# Patient Record
Sex: FEMALE | Race: WHITE | NOT HISPANIC OR LATINO | Employment: OTHER | URBAN - METROPOLITAN AREA
[De-identification: names, ages, dates, MRNs, and addresses within clinical notes are randomized per-mention and may not be internally consistent; named-entity substitution may affect disease eponyms.]

---

## 2017-10-02 ENCOUNTER — TELEPHONE (OUTPATIENT)
Dept: BARIATRICS | Facility: CLINIC | Age: 57
End: 2017-10-02

## 2017-10-02 NOTE — LETTER
[facility] - Bariatric Surgery  [address]  [facility] [address]  Phone: [phone]  Fax: [phone]     2017         Angela Avalos  [address]  [place] [address]    Patient: Angela Avaols  MRN: [id number]  : 1960    Dear Angela Avalos:    Thank you for choosing [facility]. It is time to come in for a follow-up office visit. As part of our ongoing quest to help patients lose weight and more importantly maintain that weight loss for years to come, regular follow up appointments are essential.    The [facility] is accredited by the Metabolic and Bariatric Surgery Accreditation and Quality Improvement Program. We are interested in your progress and how you have been feeling! As you know, completing regular follow-up office visits with your bariatric provider will help to maintain your successful weight loss. Additionally, being able to collect information on the positive outcomes of surgery will help us to prove the positive benefits of weight loss surgery for all our patients.     Please do not hesitate to call if you have any questions. Please call the office at [phone] to schedule your follow-up visit.    Sincerely,    MD Shahab Delvalle MD Amber Weydert, PA-C Mellanie Merrit, PA-C  [facility] - [place], [place]

## 2018-03-13 PROBLEM — D68.59 THROMBOPHILIA: Status: ACTIVE | Noted: 2018-03-13

## 2018-03-13 PROBLEM — B00.9 HERPES INFECTION: Status: ACTIVE | Noted: 2018-03-13

## 2018-03-13 PROBLEM — M15.0 PRIMARY OSTEOARTHRITIS INVOLVING MULTIPLE JOINTS: Status: ACTIVE | Noted: 2018-03-13

## 2018-03-13 PROBLEM — M15.9 PRIMARY OSTEOARTHRITIS INVOLVING MULTIPLE JOINTS: Status: ACTIVE | Noted: 2018-03-13

## 2018-06-21 PROBLEM — E55.9 VITAMIN D DEFICIENCY: Status: ACTIVE | Noted: 2018-06-21

## 2018-06-21 PROBLEM — D69.6 THROMBOCYTOPENIA: Status: ACTIVE | Noted: 2018-06-21

## 2018-06-21 PROBLEM — K76.0 FATTY LIVER DISEASE, NONALCOHOLIC: Status: ACTIVE | Noted: 2018-06-21

## 2018-06-21 PROBLEM — E11.9 TYPE 2 DIABETES MELLITUS WITHOUT COMPLICATION, WITHOUT LONG-TERM CURRENT USE OF INSULIN: Status: ACTIVE | Noted: 2018-06-21

## 2018-11-28 PROBLEM — K52.9 GASTROENTERITIS: Status: ACTIVE | Noted: 2018-11-28

## 2018-11-28 PROBLEM — A09 INFECTIOUS COLITIS: Status: ACTIVE | Noted: 2018-11-28

## 2019-02-27 PROBLEM — A09 INFECTIOUS COLITIS: Status: RESOLVED | Noted: 2018-11-28 | Resolved: 2019-02-27

## 2019-02-27 PROBLEM — R11.0 NAUSEA IN ADULT: Status: ACTIVE | Noted: 2019-02-27

## 2019-02-27 PROBLEM — K52.9 GASTROENTERITIS: Status: RESOLVED | Noted: 2018-11-28 | Resolved: 2019-02-27

## 2019-09-17 PROBLEM — E11.9 TYPE 2 DIABETES MELLITUS WITHOUT COMPLICATION, WITHOUT LONG-TERM CURRENT USE OF INSULIN: Chronic | Status: ACTIVE | Noted: 2018-06-21

## 2019-09-17 PROBLEM — M15.0 PRIMARY OSTEOARTHRITIS INVOLVING MULTIPLE JOINTS: Chronic | Status: ACTIVE | Noted: 2018-03-13

## 2019-09-17 PROBLEM — M15.9 PRIMARY OSTEOARTHRITIS INVOLVING MULTIPLE JOINTS: Chronic | Status: ACTIVE | Noted: 2018-03-13

## 2019-09-17 PROBLEM — D68.59 THROMBOPHILIA: Chronic | Status: ACTIVE | Noted: 2018-03-13

## 2019-11-05 PROBLEM — G57.12 MERALGIA PARAESTHETICA, LEFT: Status: ACTIVE | Noted: 2019-11-05

## 2020-02-05 PROBLEM — D69.6 THROMBOCYTOPENIA: Chronic | Status: ACTIVE | Noted: 2018-06-21

## 2020-02-05 PROBLEM — R11.0 NAUSEA IN ADULT: Status: RESOLVED | Noted: 2019-02-27 | Resolved: 2020-02-05

## 2020-02-05 PROBLEM — B00.9 HERPES INFECTION: Status: RESOLVED | Noted: 2018-03-13 | Resolved: 2020-02-05

## 2020-02-05 PROBLEM — E55.9 VITAMIN D DEFICIENCY: Chronic | Status: ACTIVE | Noted: 2018-06-21

## 2020-02-05 PROBLEM — K76.0 FATTY LIVER DISEASE, NONALCOHOLIC: Chronic | Status: ACTIVE | Noted: 2018-06-21

## 2020-02-06 PROBLEM — D64.9 NORMOCYTIC ANEMIA: Status: ACTIVE | Noted: 2020-02-06

## 2021-03-22 PROBLEM — R16.1 SPLENOMEGALY: Status: ACTIVE | Noted: 2021-03-22

## 2021-03-22 PROBLEM — K74.60 HEPATIC CIRRHOSIS: Status: ACTIVE | Noted: 2021-03-22

## 2021-03-22 PROBLEM — R93.5 ABNORMAL FINDINGS ON DIAGNOSTIC IMAGING OF OTHER ABDOMINAL REGIONS, INCLUDING RETROPERITONEUM: Status: ACTIVE | Noted: 2021-03-22

## 2021-06-18 PROBLEM — Z71.2 ENCOUNTER TO DISCUSS TEST RESULTS: Status: ACTIVE | Noted: 2021-06-18

## 2021-06-25 ENCOUNTER — DOCUMENTATION ONLY (OUTPATIENT)
Dept: TRANSPLANT | Facility: CLINIC | Age: 61
End: 2021-06-25

## 2021-10-19 ENCOUNTER — TELEPHONE (OUTPATIENT)
Dept: HEPATOLOGY | Facility: CLINIC | Age: 61
End: 2021-10-19

## 2021-12-06 PROBLEM — R06.02 SHORTNESS OF BREATH: Status: ACTIVE | Noted: 2021-12-06

## 2021-12-28 ENCOUNTER — OFFICE VISIT (OUTPATIENT)
Dept: HEPATOLOGY | Facility: CLINIC | Age: 61
End: 2021-12-28
Payer: MEDICARE

## 2021-12-28 VITALS
OXYGEN SATURATION: 99 % | BODY MASS INDEX: 39.79 KG/M2 | WEIGHT: 216.25 LBS | HEIGHT: 62 IN | RESPIRATION RATE: 17 BRPM | TEMPERATURE: 98 F | SYSTOLIC BLOOD PRESSURE: 122 MMHG | DIASTOLIC BLOOD PRESSURE: 64 MMHG | HEART RATE: 68 BPM

## 2021-12-28 DIAGNOSIS — D64.9 NORMOCYTIC ANEMIA: ICD-10-CM

## 2021-12-28 DIAGNOSIS — K76.0 FATTY LIVER DISEASE, NONALCOHOLIC: Primary | Chronic | ICD-10-CM

## 2021-12-28 DIAGNOSIS — R16.1 SPLENOMEGALY: ICD-10-CM

## 2021-12-28 DIAGNOSIS — K74.60 HEPATIC CIRRHOSIS, UNSPECIFIED HEPATIC CIRRHOSIS TYPE, UNSPECIFIED WHETHER ASCITES PRESENT: ICD-10-CM

## 2021-12-28 DIAGNOSIS — R60.0 PEDAL EDEMA: ICD-10-CM

## 2021-12-28 PROCEDURE — 3044F PR MOST RECENT HEMOGLOBIN A1C LEVEL <7.0%: ICD-10-PCS | Mod: CPTII,S$GLB,, | Performed by: INTERNAL MEDICINE

## 2021-12-28 PROCEDURE — 3061F PR NEG MICROALBUMINURIA RESULT DOCUMENTED/REVIEW: ICD-10-PCS | Mod: CPTII,S$GLB,, | Performed by: INTERNAL MEDICINE

## 2021-12-28 PROCEDURE — 3078F PR MOST RECENT DIASTOLIC BLOOD PRESSURE < 80 MM HG: ICD-10-PCS | Mod: CPTII,S$GLB,, | Performed by: INTERNAL MEDICINE

## 2021-12-28 PROCEDURE — 3066F NEPHROPATHY DOC TX: CPT | Mod: CPTII,S$GLB,, | Performed by: INTERNAL MEDICINE

## 2021-12-28 PROCEDURE — 99999 PR PBB SHADOW E&M-EST. PATIENT-LVL V: ICD-10-PCS | Mod: PBBFAC,,, | Performed by: INTERNAL MEDICINE

## 2021-12-28 PROCEDURE — 1159F MED LIST DOCD IN RCRD: CPT | Mod: CPTII,S$GLB,, | Performed by: INTERNAL MEDICINE

## 2021-12-28 PROCEDURE — 3074F SYST BP LT 130 MM HG: CPT | Mod: CPTII,S$GLB,, | Performed by: INTERNAL MEDICINE

## 2021-12-28 PROCEDURE — 3061F NEG MICROALBUMINURIA REV: CPT | Mod: CPTII,S$GLB,, | Performed by: INTERNAL MEDICINE

## 2021-12-28 PROCEDURE — 99999 PR PBB SHADOW E&M-EST. PATIENT-LVL V: CPT | Mod: PBBFAC,,, | Performed by: INTERNAL MEDICINE

## 2021-12-28 PROCEDURE — 99204 OFFICE O/P NEW MOD 45 MIN: CPT | Mod: S$GLB,,, | Performed by: INTERNAL MEDICINE

## 2021-12-28 PROCEDURE — 3078F DIAST BP <80 MM HG: CPT | Mod: CPTII,S$GLB,, | Performed by: INTERNAL MEDICINE

## 2021-12-28 PROCEDURE — 3066F PR DOCUMENTATION OF TREATMENT FOR NEPHROPATHY: ICD-10-PCS | Mod: CPTII,S$GLB,, | Performed by: INTERNAL MEDICINE

## 2021-12-28 PROCEDURE — 1159F PR MEDICATION LIST DOCUMENTED IN MEDICAL RECORD: ICD-10-PCS | Mod: CPTII,S$GLB,, | Performed by: INTERNAL MEDICINE

## 2021-12-28 PROCEDURE — 4010F ACE/ARB THERAPY RXD/TAKEN: CPT | Mod: CPTII,S$GLB,, | Performed by: INTERNAL MEDICINE

## 2021-12-28 PROCEDURE — 99215 OFFICE O/P EST HI 40 MIN: CPT | Mod: PBBFAC,PN | Performed by: INTERNAL MEDICINE

## 2021-12-28 PROCEDURE — 1160F PR REVIEW ALL MEDS BY PRESCRIBER/CLIN PHARMACIST DOCUMENTED: ICD-10-PCS | Mod: CPTII,S$GLB,, | Performed by: INTERNAL MEDICINE

## 2021-12-28 PROCEDURE — 4010F PR ACE/ARB THEARPY RXD/TAKEN: ICD-10-PCS | Mod: CPTII,S$GLB,, | Performed by: INTERNAL MEDICINE

## 2021-12-28 PROCEDURE — 3008F PR BODY MASS INDEX (BMI) DOCUMENTED: ICD-10-PCS | Mod: CPTII,S$GLB,, | Performed by: INTERNAL MEDICINE

## 2021-12-28 PROCEDURE — 3044F HG A1C LEVEL LT 7.0%: CPT | Mod: CPTII,S$GLB,, | Performed by: INTERNAL MEDICINE

## 2021-12-28 PROCEDURE — 1160F RVW MEDS BY RX/DR IN RCRD: CPT | Mod: CPTII,S$GLB,, | Performed by: INTERNAL MEDICINE

## 2021-12-28 PROCEDURE — 3008F BODY MASS INDEX DOCD: CPT | Mod: CPTII,S$GLB,, | Performed by: INTERNAL MEDICINE

## 2021-12-28 PROCEDURE — 99204 PR OFFICE/OUTPT VISIT, NEW, LEVL IV, 45-59 MIN: ICD-10-PCS | Mod: S$GLB,,, | Performed by: INTERNAL MEDICINE

## 2021-12-28 PROCEDURE — 3074F PR MOST RECENT SYSTOLIC BLOOD PRESSURE < 130 MM HG: ICD-10-PCS | Mod: CPTII,S$GLB,, | Performed by: INTERNAL MEDICINE

## 2022-02-21 PROBLEM — R18.8 CIRRHOSIS OF LIVER WITH ASCITES: Status: ACTIVE | Noted: 2021-03-22

## 2022-02-28 ENCOUNTER — TELEPHONE (OUTPATIENT)
Dept: HEPATOLOGY | Facility: CLINIC | Age: 62
End: 2022-02-28
Payer: MEDICARE

## 2022-02-28 NOTE — TELEPHONE ENCOUNTER
Spoke with  Angela and rescheduled her appt from March at [facility] to May 2022. I also put her on the ewaitlist. And advised her to continue with her 3 month follow up labs to be done.

## 2022-04-06 PROBLEM — R06.02 SHORTNESS OF BREATH: Status: RESOLVED | Noted: 2021-12-06 | Resolved: 2022-04-06

## 2022-04-06 PROBLEM — G57.12 MERALGIA PARAESTHETICA, LEFT: Status: RESOLVED | Noted: 2019-11-05 | Resolved: 2022-04-06

## 2022-04-06 PROBLEM — Z71.2 ENCOUNTER TO DISCUSS TEST RESULTS: Status: RESOLVED | Noted: 2021-06-18 | Resolved: 2022-04-06

## 2022-04-06 PROBLEM — R16.1 SPLENOMEGALY: Status: RESOLVED | Noted: 2021-03-22 | Resolved: 2022-04-06

## 2022-04-06 PROBLEM — D64.9 NORMOCYTIC ANEMIA: Status: RESOLVED | Noted: 2020-02-06 | Resolved: 2022-04-06

## 2022-04-06 PROBLEM — R93.5 ABNORMAL FINDINGS ON DIAGNOSTIC IMAGING OF OTHER ABDOMINAL REGIONS, INCLUDING RETROPERITONEUM: Status: RESOLVED | Noted: 2021-03-22 | Resolved: 2022-04-06

## 2022-04-06 PROBLEM — K76.0 FATTY LIVER DISEASE, NONALCOHOLIC: Chronic | Status: RESOLVED | Noted: 2018-06-21 | Resolved: 2022-04-06

## 2022-05-10 ENCOUNTER — OFFICE VISIT (OUTPATIENT)
Dept: HEPATOLOGY | Facility: CLINIC | Age: 62
End: 2022-05-10
Payer: MEDICARE

## 2022-05-10 VITALS
BODY MASS INDEX: 40.25 KG/M2 | HEIGHT: 62 IN | RESPIRATION RATE: 18 BRPM | WEIGHT: 218.69 LBS | DIASTOLIC BLOOD PRESSURE: 72 MMHG | TEMPERATURE: 98 F | OXYGEN SATURATION: 99 % | SYSTOLIC BLOOD PRESSURE: 127 MMHG | HEART RATE: 63 BPM

## 2022-05-10 DIAGNOSIS — I85.00 ESOPHAGEAL VARICES WITHOUT BLEEDING, UNSPECIFIED ESOPHAGEAL VARICES TYPE: ICD-10-CM

## 2022-05-10 DIAGNOSIS — K74.60 CIRRHOSIS OF LIVER WITH ASCITES, UNSPECIFIED HEPATIC CIRRHOSIS TYPE: Primary | ICD-10-CM

## 2022-05-10 DIAGNOSIS — R18.8 CIRRHOSIS OF LIVER WITH ASCITES, UNSPECIFIED HEPATIC CIRRHOSIS TYPE: Primary | ICD-10-CM

## 2022-05-10 DIAGNOSIS — J90 PLEURAL EFFUSION: ICD-10-CM

## 2022-05-10 DIAGNOSIS — R60.9 EDEMA, UNSPECIFIED TYPE: ICD-10-CM

## 2022-05-10 PROCEDURE — 99999 PR PBB SHADOW E&M-EST. PATIENT-LVL V: CPT | Mod: PBBFAC,,, | Performed by: INTERNAL MEDICINE

## 2022-05-10 PROCEDURE — 1159F PR MEDICATION LIST DOCUMENTED IN MEDICAL RECORD: ICD-10-PCS | Mod: CPTII,S$GLB,, | Performed by: INTERNAL MEDICINE

## 2022-05-10 PROCEDURE — 3044F HG A1C LEVEL LT 7.0%: CPT | Mod: CPTII,S$GLB,, | Performed by: INTERNAL MEDICINE

## 2022-05-10 PROCEDURE — 1159F MED LIST DOCD IN RCRD: CPT | Mod: CPTII,S$GLB,, | Performed by: INTERNAL MEDICINE

## 2022-05-10 PROCEDURE — 99999 PR PBB SHADOW E&M-EST. PATIENT-LVL V: ICD-10-PCS | Mod: PBBFAC,,, | Performed by: INTERNAL MEDICINE

## 2022-05-10 PROCEDURE — 99214 PR OFFICE/OUTPT VISIT, EST, LEVL IV, 30-39 MIN: ICD-10-PCS | Mod: S$GLB,,, | Performed by: INTERNAL MEDICINE

## 2022-05-10 PROCEDURE — 3078F DIAST BP <80 MM HG: CPT | Mod: CPTII,S$GLB,, | Performed by: INTERNAL MEDICINE

## 2022-05-10 PROCEDURE — 3008F PR BODY MASS INDEX (BMI) DOCUMENTED: ICD-10-PCS | Mod: CPTII,S$GLB,, | Performed by: INTERNAL MEDICINE

## 2022-05-10 PROCEDURE — 3044F PR MOST RECENT HEMOGLOBIN A1C LEVEL <7.0%: ICD-10-PCS | Mod: CPTII,S$GLB,, | Performed by: INTERNAL MEDICINE

## 2022-05-10 PROCEDURE — 4010F ACE/ARB THERAPY RXD/TAKEN: CPT | Mod: CPTII,S$GLB,, | Performed by: INTERNAL MEDICINE

## 2022-05-10 PROCEDURE — 3074F SYST BP LT 130 MM HG: CPT | Mod: CPTII,S$GLB,, | Performed by: INTERNAL MEDICINE

## 2022-05-10 PROCEDURE — 3078F PR MOST RECENT DIASTOLIC BLOOD PRESSURE < 80 MM HG: ICD-10-PCS | Mod: CPTII,S$GLB,, | Performed by: INTERNAL MEDICINE

## 2022-05-10 PROCEDURE — 3008F BODY MASS INDEX DOCD: CPT | Mod: CPTII,S$GLB,, | Performed by: INTERNAL MEDICINE

## 2022-05-10 PROCEDURE — 99214 OFFICE O/P EST MOD 30 MIN: CPT | Mod: S$GLB,,, | Performed by: INTERNAL MEDICINE

## 2022-05-10 PROCEDURE — 4010F PR ACE/ARB THEARPY RXD/TAKEN: ICD-10-PCS | Mod: CPTII,S$GLB,, | Performed by: INTERNAL MEDICINE

## 2022-05-10 PROCEDURE — 3074F PR MOST RECENT SYSTOLIC BLOOD PRESSURE < 130 MM HG: ICD-10-PCS | Mod: CPTII,S$GLB,, | Performed by: INTERNAL MEDICINE

## 2022-05-10 RX ORDER — SPIRONOLACTONE 100 MG/1
150 TABLET, FILM COATED ORAL DAILY
Qty: 45 TABLET | Refills: 5 | Status: SHIPPED | OUTPATIENT
Start: 2022-05-10 | End: 2022-05-11

## 2022-05-10 RX ORDER — FUROSEMIDE 40 MG/1
60 TABLET ORAL DAILY
Qty: 45 TABLET | Refills: 5 | Status: SHIPPED | OUTPATIENT
Start: 2022-05-10 | End: 2022-12-09

## 2022-05-10 NOTE — PROGRESS NOTES
HEPATOLOGY FOLLOW UP    Referring Physician: Juliann Ma MD  Current Corresponding Physician: Juliann Ma MD, Kenney Montana MD, Delgado Leong MD, Zach Ramírez MD    Angela Avalos is here for follow up of Cirrhosis      HPI  Angela Avalos is a 61 y.o. female with pmhx of DMII, HTN, arthritis, thrombocytopenia (seeing hematologist for w/u), hx sleeve gastrectomy and fatty liver who was referred for evaluation of cirrhosis. I saw her in consultation 12/28/21.     Since 2018 her platelet count has been between 's associated with mild normocytic normochromic anemia hgb 10-11's. In 01/2021 blood work was remarkable for positive GERARD. Imaging shows cirrhosis, presumed to be from her known fatty liver. About 2 months ago she developed edema and was placed on low dose diuretics (lasix 20 mg daily and spironolactone 50 mg daily). She lost 22 lbs with this and her edema is well controlled at present. She denies HE symptoms. She has not had and EGD to screen for varices.     Labs 12/1/21: plts 56, INR 1.2  12/13/21: creat 0.8, Na 140, ALT 18, aST 40, ALKP 105,  Hemoglobin A1C 4.9, HCV Ab-, HBsAg-, HAV total Ab+, GERARD+ (1:320), ASMA-, iron sat 28%  6/21 AFP 2.7  BMI 39.56 (has lost about 40 lbs)  Alcohol: no significant alcohol     MRI w wo conrast 3/29/21: The liver demonstrates a shrunken nodular contour consistent with cirrhosis.  No focal liver mass is seen.  No abnormal liver enhancement noted.  Previous cholecystectomy.  Mild splenomegaly.  No focal splenic lesion.  No abnormality seen within the pancreas or kidneys; MRCP images demonstrate moderate dilatation of the common duct measuring up to 1.4 cm proximally and tapers to a normal caliber distally.  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 Some of it has come back. Not placed on blood thinner due to thrombocytopenia.    Labs 5/6/22: Tbil 1.3, ALT 21, aST 45, ALKP 87, plts 65  3/21/22: AFP 2.5  Serologic w/u: GERARD+ (1;320), ASMA-, AMA-, TTG IgA-, HCV Ab-, HBsAg-, HBcAb-, HBsAb-, HAV IgG+    Edema/heaptic hydrothorax, ongoing: lasix 40 mg and spironolactone 100 mg; had to take metolazone 2.5 mg daily for 3 days when  5/6/22 showed and increase in a pleural effusion. Pt denies SOB. She is asking if she needs a tips.    Abdo US 1/5/22: Nodular contour of the liver with diffuse parenchymal heterogeneity consistent with cirrhotic change. The liver is enlarged; right pleural effusion    EGD 3/30/22: small esophageal varices; sleeve gastrectomy; started on beta blocker but BP plummeted - now off    MELD-Na score: 10 at 5/6/2022  7:45 AM  MELD score: 10 at 5/6/2022  7:45 AM  Calculated from:  Serum Creatinine: 0.90 mg/dL (Using min of 1 mg/dL) at 5/6/2022  7:45 AM  Serum Sodium: 140 mmol/L (Using max of 137 mmol/L) at 5/6/2022  7:45 AM  Total Bilirubin: 1.3 mg/dL at 5/6/2022  7:45 AM  INR(ratio): 1.3 at 5/6/2022  7:45 AM  Age: 62 years    Outpatient Encounter Medications as of 5/10/2022   Medication Sig Dispense Refill    ALPRAZolam (XANAX) 0.25 MG tablet Take 1 tablet (0.25 mg total) by mouth nightly. 30 tablet 0    atorvastatin (LIPITOR) 10 MG tablet Take 1 tablet (10 mg total) by mouth every evening. 90 tablet 3    blood sugar diagnostic Strp 1 strip by Misc.(Non-Drug; Combo Route) route 2 (two) times a day. 100 each 0    blood-glucose meter Misc 1 kit by Misc.(Non-Drug; Combo Route) route once. for 1 dose 1 each 0    carvediloL  "(COREG) 3.125 MG tablet Take 1 tablet (3.125 mg total) by mouth 2 (two) times daily with meals. 60 tablet 11    clotrimazole-betamethasone 1-0.05% (LOTRISONE) cream Apply topically.      ergocalciferol (ERGOCALCIFEROL) 50,000 unit Cap Take 1 capsule (50,000 Units total) by mouth every 7 days. 12 capsule 3    furosemide (LASIX) 40 MG tablet Take 1 tablet (40 mg total) by mouth once daily. (Patient taking differently: Take 20 mg by mouth once daily.) 30 tablet 5    lancets (LANCETS,THIN) Misc 1 each by Misc.(Non-Drug; Combo Route) route 2 (two) times a day. 100 each 0    oxyCODONE (ROXICODONE) 15 MG Tab Take 1 tablet (15 mg total) by mouth every 12 (twelve) hours as needed for Pain. Medically Necessary for patient to be on this medication for longer than 7 days. 60 tablet 0    pantoprazole (PROTONIX) 40 MG tablet TAKE 1 TABLET(40 MG) BY MOUTH EVERY DAY 30 tablet 5    pen needle, diabetic (BD ULTRA-FINE SHORT PEN NEEDLE) 31 gauge x 5/16" Ndle USE ONCE A DAY WITH VICTOZA 100 each 5    spironolactone (ALDACTONE) 100 MG tablet Take 1 tablet (100 mg total) by mouth once daily. (Patient taking differently: Take 50 mg by mouth once daily.) 30 tablet 5     Facility-Administered Encounter Medications as of 5/10/2022   Medication Dose Route Frequency Provider Last Rate Last Admin    0.9%  NaCl infusion   Intravenous Continuous Delgado Leong MD   Stopped at 03/30/22 0942     Review of patient's allergies indicates:  No Known Allergies  Past Medical History:   Diagnosis Date    Anxiety     BMI 50.0-59.9, adult     Chronic tonsillitis     Clotting disorder     Deep vein thrombosis 2004    right leg    Depression     Diabetes mellitus 2012    insulin    DVT (deep venous thrombosis) 2004    Right leg    GERD (gastroesophageal reflux disease)     Hiatal hernia     Hyperlipidemia     Hypertension     Internal hemorrhoids     Lower back pain     Morbid obesity     Personal history of colonic polyps     " non-neoplastic    Primary osteoarthritis involving multiple joints 03/13/2018    Stroke     Unspecified cirrhosis of liver        Review of Systems   Constitutional: Negative.    HENT: Negative.    Eyes: Negative.    Respiratory: Negative.    Cardiovascular: Negative.    Gastrointestinal: Negative.    Genitourinary: Negative.    Musculoskeletal: Negative.    Skin: Negative.    Neurological: Negative.    Psychiatric/Behavioral: Negative.      Vitals:    05/10/22 0902   BP: 127/72   Pulse: 63   Resp: 18   Temp: 98 °F (36.7 °C)       Physical Exam  Vitals reviewed.   Constitutional:       Appearance: She is well-developed.   HENT:      Head: Normocephalic and atraumatic.   Eyes:      General: No scleral icterus.     Conjunctiva/sclera: Conjunctivae normal.      Pupils: Pupils are equal, round, and reactive to light.   Neck:      Thyroid: No thyromegaly.   Cardiovascular:      Rate and Rhythm: Normal rate and regular rhythm.      Heart sounds: Normal heart sounds.   Pulmonary:      Effort: Pulmonary effort is normal.      Breath sounds: Normal breath sounds. No rales.   Abdominal:      General: Bowel sounds are normal. There is no distension.      Palpations: Abdomen is soft. There is no mass.      Tenderness: There is no abdominal tenderness.   Musculoskeletal:         General: Normal range of motion.      Cervical back: Normal range of motion and neck supple.   Skin:     General: Skin is warm and dry.      Findings: No rash.   Neurological:      Mental Status: She is alert and oriented to person, place, and time.         MELD-Na score: 10 at 5/6/2022  7:45 AM  MELD score: 10 at 5/6/2022  7:45 AM  Calculated from:  Serum Creatinine: 0.90 mg/dL (Using min of 1 mg/dL) at 5/6/2022  7:45 AM  Serum Sodium: 140 mmol/L (Using max of 137 mmol/L) at 5/6/2022  7:45 AM  Total Bilirubin: 1.3 mg/dL at 5/6/2022  7:45 AM  INR(ratio): 1.3 at 5/6/2022  7:45 AM  Age: 62 years    Lab Results   Component Value Date      05/06/2022    BUN 20 (H) 05/06/2022    CREATININE 0.90 05/06/2022    CALCIUM 8.7 05/06/2022     05/06/2022    K 4.4 05/06/2022     05/06/2022    PROT 7.5 05/06/2022    CO2 31 (H) 05/06/2022    ANIONGAP 5 (L) 05/06/2022    WBC 2.90 (L) 05/06/2022    RBC 3.70 (L) 05/06/2022    HGB 11.6 (L) 05/06/2022    HCT 33.7 (L) 05/06/2022    MCV 91 05/06/2022    MCH 31.3 (H) 05/06/2022    MCHC 34.5 05/06/2022     Lab Results   Component Value Date    RDW 15.2 (H) 05/06/2022    PLT 65 (L) 05/06/2022    MPV 6.9 (L) 05/06/2022    GRAN 1.9 05/06/2022    GRAN 65.2 05/06/2022    LYMPH 0.7 (L) 05/06/2022    LYMPH 24.6 05/06/2022    MONO 0.2 (L) 05/06/2022    MONO 7.0 05/06/2022    EOSINOPHIL 2.6 05/06/2022    BASOPHIL 0.6 05/06/2022    EOS 0.1 05/06/2022    BASO 0.00 05/06/2022    APTT 30.5 02/18/2021    GROUPTRH A POS 02/27/2014    GERARD POSITIVE (A) 01/29/2021    CHOL 176 01/14/2022    TRIG 58 01/14/2022    HDL 53 01/14/2022    CHOLHDL 30.1 01/14/2022    TOTALCHOLEST 3.3 01/14/2022    ALBUMIN 3.8 05/06/2022    BILIDIR 0.1 12/29/2021    AST 45 (H) 05/06/2022    ALT 21 05/06/2022    ALKPHOS 87 05/06/2022    MG 1.7 11/27/2013    INR 1.3 (H) 05/06/2022       Assessment and Plan:  Angela Avalos is a 61 y.o. female with mild decompensation of NAFLD-inducedCirrhosis  Current recommendations:   1. Cirrhosis, decompensated with edema, esophageal varices and possible hepatic hydrothorax: MELD <15. Remains medically early for liver tx. Will check meld labs every 3 months and will refer for liver transplant when MELD >15. Screen for HCC every 6 months with imaging and AFP. Abdo US due 07/22; cirrhosis likely due to NAFLD. Recommend consider liver biopsy to r/o autoimmune hepatitis if liver enzymes increase. Recommend vaccination for hepatitis B.  2. Portal htn and thrombocytopenia. Repeat EGD with Dr Leong to f/u varices in one year; remain off beta blocker since has not had any bleeding and varices are small and she developed  hypotension on this med  3. HE, none: monitor; wean off bzd  4. Edema/worsening pleural effusion- likely hepatic hydrothorax, worse: increase lasix to 60 mg daily and spironolactone to 150 mg daily. Check labs in one month; no need for TIPS for now; repeat cxr today  5. Platelet threshold is 60 for use of blood thinners; consider starting if need to save vision in eye; will need close monitoring.     Return 1 month

## 2022-05-10 NOTE — PATIENT INSTRUCTIONS
Labs in one month and then every 3 months  Abdo US 7/22  EGD 3/23; can stay off beta blocker since BP went down  Increase lasix to 60 mg and sprinolactone to 150 mg daily  Platelet threshold for blood thinners is 60  [phone]

## 2022-06-20 PROBLEM — K74.69 OTHER CIRRHOSIS OF LIVER: Status: ACTIVE | Noted: 2021-03-22

## 2022-06-22 PROBLEM — Z71.89 ENCOUNTER TO DISCUSS TREATMENT OPTIONS: Status: ACTIVE | Noted: 2022-06-22

## 2022-06-22 PROBLEM — Z71.2 ENCOUNTER TO DISCUSS TEST RESULTS: Status: ACTIVE | Noted: 2022-06-22

## 2022-06-22 PROBLEM — D50.9 IRON DEFICIENCY ANEMIA: Status: ACTIVE | Noted: 2022-06-22

## 2022-07-08 ENCOUNTER — TELEPHONE (OUTPATIENT)
Dept: HEPATOLOGY | Facility: CLINIC | Age: 62
End: 2022-07-08
Payer: MEDICARE

## 2022-07-11 ENCOUNTER — TELEPHONE (OUTPATIENT)
Dept: HEPATOLOGY | Facility: CLINIC | Age: 62
End: 2022-07-11
Payer: MEDICARE

## 2022-07-11 NOTE — TELEPHONE ENCOUNTER
Spoke with Ms. Miller and reshceduled her, add ed her labs to the labs she is doing for Dr. Ramirez

## 2022-07-18 ENCOUNTER — TELEPHONE (OUTPATIENT)
Dept: HEPATOLOGY | Facility: CLINIC | Age: 62
End: 2022-07-18
Payer: MEDICARE

## 2022-07-22 PROBLEM — D61.818 PANCYTOPENIA: Status: ACTIVE | Noted: 2022-07-22

## 2022-09-13 ENCOUNTER — OFFICE VISIT (OUTPATIENT)
Dept: HEPATOLOGY | Facility: CLINIC | Age: 62
End: 2022-09-13
Payer: MEDICARE

## 2022-09-13 VITALS
SYSTOLIC BLOOD PRESSURE: 142 MMHG | DIASTOLIC BLOOD PRESSURE: 62 MMHG | HEIGHT: 61 IN | BODY MASS INDEX: 46.33 KG/M2 | TEMPERATURE: 98 F | RESPIRATION RATE: 19 BRPM | WEIGHT: 245.38 LBS | OXYGEN SATURATION: 99 % | HEART RATE: 63 BPM

## 2022-09-13 DIAGNOSIS — J90 PLEURAL EFFUSION: Primary | ICD-10-CM

## 2022-09-13 DIAGNOSIS — K74.69 OTHER CIRRHOSIS OF LIVER: ICD-10-CM

## 2022-09-13 DIAGNOSIS — D69.6 THROMBOCYTOPENIA: Chronic | ICD-10-CM

## 2022-09-13 DIAGNOSIS — R16.1 SPLENOMEGALY: ICD-10-CM

## 2022-09-13 DIAGNOSIS — R60.9 EDEMA, UNSPECIFIED TYPE: ICD-10-CM

## 2022-09-13 DIAGNOSIS — I85.10 SECONDARY ESOPHAGEAL VARICES WITHOUT BLEEDING: ICD-10-CM

## 2022-09-13 PROCEDURE — 1160F PR REVIEW ALL MEDS BY PRESCRIBER/CLIN PHARMACIST DOCUMENTED: ICD-10-PCS | Mod: CPTII,S$GLB,, | Performed by: INTERNAL MEDICINE

## 2022-09-13 PROCEDURE — 3077F PR MOST RECENT SYSTOLIC BLOOD PRESSURE >= 140 MM HG: ICD-10-PCS | Mod: CPTII,S$GLB,, | Performed by: INTERNAL MEDICINE

## 2022-09-13 PROCEDURE — 99215 OFFICE O/P EST HI 40 MIN: CPT | Mod: S$GLB,,, | Performed by: INTERNAL MEDICINE

## 2022-09-13 PROCEDURE — 99215 PR OFFICE/OUTPT VISIT, EST, LEVL V, 40-54 MIN: ICD-10-PCS | Mod: S$GLB,,, | Performed by: INTERNAL MEDICINE

## 2022-09-13 PROCEDURE — 3078F DIAST BP <80 MM HG: CPT | Mod: CPTII,S$GLB,, | Performed by: INTERNAL MEDICINE

## 2022-09-13 PROCEDURE — 3008F PR BODY MASS INDEX (BMI) DOCUMENTED: ICD-10-PCS | Mod: CPTII,S$GLB,, | Performed by: INTERNAL MEDICINE

## 2022-09-13 PROCEDURE — 99999 PR PBB SHADOW E&M-EST. PATIENT-LVL V: ICD-10-PCS | Mod: PBBFAC,,, | Performed by: INTERNAL MEDICINE

## 2022-09-13 PROCEDURE — 1160F RVW MEDS BY RX/DR IN RCRD: CPT | Mod: CPTII,S$GLB,, | Performed by: INTERNAL MEDICINE

## 2022-09-13 PROCEDURE — 4010F PR ACE/ARB THEARPY RXD/TAKEN: ICD-10-PCS | Mod: CPTII,S$GLB,, | Performed by: INTERNAL MEDICINE

## 2022-09-13 PROCEDURE — 99999 PR PBB SHADOW E&M-EST. PATIENT-LVL V: CPT | Mod: PBBFAC,,, | Performed by: INTERNAL MEDICINE

## 2022-09-13 PROCEDURE — 3008F BODY MASS INDEX DOCD: CPT | Mod: CPTII,S$GLB,, | Performed by: INTERNAL MEDICINE

## 2022-09-13 PROCEDURE — 3078F PR MOST RECENT DIASTOLIC BLOOD PRESSURE < 80 MM HG: ICD-10-PCS | Mod: CPTII,S$GLB,, | Performed by: INTERNAL MEDICINE

## 2022-09-13 PROCEDURE — 1159F PR MEDICATION LIST DOCUMENTED IN MEDICAL RECORD: ICD-10-PCS | Mod: CPTII,S$GLB,, | Performed by: INTERNAL MEDICINE

## 2022-09-13 PROCEDURE — 1159F MED LIST DOCD IN RCRD: CPT | Mod: CPTII,S$GLB,, | Performed by: INTERNAL MEDICINE

## 2022-09-13 PROCEDURE — 4010F ACE/ARB THERAPY RXD/TAKEN: CPT | Mod: CPTII,S$GLB,, | Performed by: INTERNAL MEDICINE

## 2022-09-13 PROCEDURE — 3044F HG A1C LEVEL LT 7.0%: CPT | Mod: CPTII,S$GLB,, | Performed by: INTERNAL MEDICINE

## 2022-09-13 PROCEDURE — 3077F SYST BP >= 140 MM HG: CPT | Mod: CPTII,S$GLB,, | Performed by: INTERNAL MEDICINE

## 2022-09-13 PROCEDURE — 3044F PR MOST RECENT HEMOGLOBIN A1C LEVEL <7.0%: ICD-10-PCS | Mod: CPTII,S$GLB,, | Performed by: INTERNAL MEDICINE

## 2022-09-13 NOTE — PROGRESS NOTES
HEPATOLOGY FOLLOW UP    Referring Physician: Juliann Ma MD  Current Corresponding Physician: Juliann Ma MD, Kenney Montana MD, Delgado Leong MD, Zach Ramírez MD    Angela Avalos is here for follow up of Cirrhosis      HPI  Angela Avalos is a 62 y.o. female with pmhx of DMII, HTN, arthritis, thrombocytopenia (seeing hematologist for w/u), hx sleeve gastrectomy and fatty liver who was referred for evaluation of cirrhosis. I saw her in consultation 12/28/21.     Since 2018 her platelet count has been between 's associated with mild normocytic normochromic anemia hgb 10-11's. In 01/2021 blood work was remarkable for positive GERARD. Imaging shows cirrhosis, presumed to be from her known fatty liver. About 2 months ago she developed edema and was placed on low dose diuretics (lasix 20 mg daily and spironolactone 50 mg daily). She lost 22 lbs with this and her edema is well controlled at present. She denies HE symptoms. She has not had and EGD to screen for varices.     Labs 12/1/21: plts 56, INR 1.2  12/13/21: creat 0.8, Na 140, ALT 18, aST 40, ALKP 105,  Hemoglobin A1C 4.9, HCV Ab-, HBsAg-, HAV total Ab+, GERARD+ (1:320), ASMA-, iron sat 28%  6/21 AFP 2.7  BMI 39.56 (has lost about 40 lbs)  Alcohol: no significant alcohol     MRI w wo conrast 3/29/21: The liver demonstrates a shrunken nodular contour consistent with cirrhosis.  No focal liver mass is seen.  No abnormal liver enhancement noted.  Previous cholecystectomy.  Mild splenomegaly.  No focal splenic lesion.  No abnormality seen within the pancreas or kidneys; MRCP images demonstrate moderate dilatation of the common duct measuring up to 1.4 cm proximally and tapers to a normal caliber distally.  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 Some of it has come back. Not placed on blood thinner initially but after I saw her she was placed on eliquis but taken off when her plts fell to below 60. She tells me due to the fall in plts she had a bone marrow biopsy. This was normal.    Labs 8/8/22: Tbil 0.9, ALT 30, AST 46, ALKP 103, plts 55  7/22/22: AFP 2.5  Serologic w/u: GERARD+ (1;320), ASMA-, AMA-, TTG IgA-, HCV Ab-, HBsAg-, HBcAb-, HBsAb-, HAV IgG+    Edema/hepatic hydrothorax, ongoing: lasix 40 mg and spironolactone 100 mg; had to take metolazone 2.5 mg daily for 3 days when cxr 5/6/22 showed and increase in a pleural effusion. I had increased her diuretics to 60 mg and 150 mg respectively, but she lowered them back due to dryness of the mouth. Her Us 7/5/22 and cxr 9/1/22 both show stability of fluid. Pt denies SOB. Her weight gain is likely due to an increase in appetite and her love of chocolate. She is asking again if she needs a tips.    Abdo US 7/5/22: cirrhosis; no HCC; minimal fluid around the liver; does have R pleural effusion  CXR 9/1/22: Small stable right pleural effusion.    EGD 3/30/22: small esophageal varices; sleeve gastrectomy; started on beta blocker but BP plummeted - now off    MELD-Na score: 10 at 7/13/2022  7:47 AM  MELD score: 10 at 7/13/2022  7:47 AM  Calculated from:  Serum Creatinine: 0.79 mg/dL (Using min of 1 mg/dL) at 7/13/2022  7:47 AM  Serum Sodium: 140 mmol/L (Using max of 137 mmol/L) at 7/13/2022  7:47 AM  Total Bilirubin: 0.6 mg/dL (Using min of 1 mg/dL) at 7/13/2022  7:47 AM  INR(ratio): 1.4 at 7/13/2022  7:47 AM  Age: 62 years    Outpatient Encounter Medications as of 9/13/2022   Medication Sig  "Dispense Refill    ALPRAZolam (XANAX) 0.25 MG tablet Take 1 tablet (0.25 mg total) by mouth nightly. 30 tablet 0    atorvastatin (LIPITOR) 10 MG tablet Take 1 tablet (10 mg total) by mouth every evening. 90 tablet 3    blood sugar diagnostic Strp 1 strip by Misc.(Non-Drug; Combo Route) route 2 (two) times a day. 100 each 0    blood-glucose meter Misc 1 kit by Misc.(Non-Drug; Combo Route) route once. for 1 dose 1 each 0    ergocalciferol (ERGOCALCIFEROL) 50,000 unit Cap Take 1 capsule (50,000 Units total) by mouth every 7 days. 12 capsule 3    ferrous sulfate 325 (65 FE) MG EC tablet Take 1 tablet (325 mg total) by mouth every other day. With orange juice or vitamin c 90 tablet 2    furosemide (LASIX) 40 MG tablet Take 1.5 tablets (60 mg total) by mouth once daily. (Patient taking differently: Take 40 mg by mouth once daily.) 45 tablet 5    hydrocortisone 2.5 % cream       lancets (LANCETS,THIN) Misc 1 each by Misc.(Non-Drug; Combo Route) route 2 (two) times a day. 100 each 0    mupirocin (BACTROBAN) 2 % ointment       oxyCODONE (ROXICODONE) 15 MG Tab Take 1 tablet (15 mg total) by mouth every 12 (twelve) hours as needed for Pain. Medically Necessary for patient to be on this medication for longer than 7 days. 60 tablet 0    pen needle, diabetic (BD ULTRA-FINE SHORT PEN NEEDLE) 31 gauge x 5/16" Ndle USE ONCE A DAY WITH VICTOZA 100 each 5    spironolactone (ALDACTONE) 100 MG tablet Take 1 tablet (100 mg total) by mouth once daily. 30 tablet 5    triamcinolone acetonide 0.1% (KENALOG) 0.1 % cream Apply topically 2 (two) times daily. 45 g 1    apixaban (ELIQUIS) 2.5 mg Tab Take 1 tablet (2.5 mg total) by mouth 2 (two) times daily. (Patient not taking: Reported on 9/13/2022) 60 tablet 5    clotrimazole-betamethasone 1-0.05% (LOTRISONE) cream Apply topically.      pantoprazole (PROTONIX) 40 MG tablet TAKE 1 TABLET(40 MG) BY MOUTH EVERY DAY (Patient not taking: Reported on 9/13/2022) 30 tablet 5    sertraline (ZOLOFT) 25 " MG tablet Take 1 tablet (25 mg total) by mouth once daily. (Patient not taking: Reported on 9/13/2022) 30 tablet 11     Facility-Administered Encounter Medications as of 9/13/2022   Medication Dose Route Frequency Provider Last Rate Last Admin    0.9%  NaCl infusion   Intravenous Continuous Delgado Leong MD   Stopped at 03/30/22 0942     Review of patient's allergies indicates:  No Known Allergies  Past Medical History:   Diagnosis Date    Anxiety     BMI 50.0-59.9, adult     Chronic tonsillitis     Clotting disorder     Deep vein thrombosis 2004    right leg    Depression     Diabetes mellitus 2012    insulin    DVT (deep venous thrombosis) 2004    Right leg    Edema 5/10/2022    GERD (gastroesophageal reflux disease)     Hiatal hernia     Hyperlipidemia     Hypertension     Internal hemorrhoids     Lower back pain     Morbid obesity     Personal history of colonic polyps     non-neoplastic    Pleural effusion 5/10/2022    Presumed hepatic hydrothorax    Primary osteoarthritis involving multiple joints 03/13/2018    Stroke     Unspecified cirrhosis of liver        Review of Systems   Constitutional: Negative.    HENT: Negative.     Eyes: Negative.    Respiratory: Negative.     Cardiovascular: Negative.    Gastrointestinal: Negative.    Genitourinary: Negative.    Musculoskeletal: Negative.    Skin: Negative.    Neurological: Negative.    Psychiatric/Behavioral: Negative.       BMI 46  Vitals:    09/13/22 1329   BP: (!) 142/62   Pulse: 63   Resp: 19   Temp: 98.2 °F (36.8 °C)       Physical Exam  Vitals reviewed.   Constitutional:       Appearance: She is well-developed.   HENT:      Head: Normocephalic and atraumatic.   Eyes:      General: No scleral icterus.     Conjunctiva/sclera: Conjunctivae normal.      Pupils: Pupils are equal, round, and reactive to light.   Neck:      Thyroid: No thyromegaly.   Cardiovascular:      Rate and Rhythm: Normal rate and regular rhythm.      Heart sounds: Normal heart sounds.    Pulmonary:      Effort: Pulmonary effort is normal.      Breath sounds: Normal breath sounds. No rales.   Abdominal:      General: Bowel sounds are normal. There is no distension.      Palpations: Abdomen is soft. There is no mass.      Tenderness: There is no abdominal tenderness.   Musculoskeletal:         General: Normal range of motion.      Cervical back: Normal range of motion and neck supple.   Skin:     General: Skin is warm and dry.      Findings: No rash.   Neurological:      Mental Status: She is alert and oriented to person, place, and time.       MELD-Na score: 10 at 7/13/2022  7:47 AM  MELD score: 10 at 7/13/2022  7:47 AM  Calculated from:  Serum Creatinine: 0.79 mg/dL (Using min of 1 mg/dL) at 7/13/2022  7:47 AM  Serum Sodium: 140 mmol/L (Using max of 137 mmol/L) at 7/13/2022  7:47 AM  Total Bilirubin: 0.6 mg/dL (Using min of 1 mg/dL) at 7/13/2022  7:47 AM  INR(ratio): 1.4 at 7/13/2022  7:47 AM  Age: 62 years    Lab Results   Component Value Date     08/08/2022    BUN 21 (H) 08/08/2022    CREATININE 0.80 08/08/2022    CALCIUM 8.4 08/08/2022     08/08/2022    K 4.2 08/08/2022     08/08/2022    PROT 7.7 08/08/2022    CO2 32 (H) 08/08/2022    ANIONGAP 4 (L) 08/08/2022    WBC 4.90 08/08/2022    RBC 3.96 (L) 08/08/2022    HGB 12.5 08/08/2022    HCT 36.1 (L) 08/08/2022    MCV 91 08/08/2022    MCH 31.6 (H) 08/08/2022    MCHC 34.7 08/08/2022     Lab Results   Component Value Date    RDW 19.4 (H) 08/08/2022    PLT 55 (L) 08/08/2022    MPV 7.3 (L) 08/08/2022    GRAN 3.6 08/08/2022    GRAN 74.8 (H) 08/08/2022    LYMPH 0.9 (L) 08/08/2022    LYMPH 17.5 (L) 08/08/2022    MONO 0.2 (L) 08/08/2022    MONO 5.0 08/08/2022    EOSINOPHIL 2.1 08/08/2022    BASOPHIL 0.6 08/08/2022    EOS 0.1 08/08/2022    BASO 0.00 08/08/2022    APTT 30.5 02/18/2021    GROUPTRH A POS 02/27/2014    GERARD POSITIVE (A) 01/29/2021    CHOL 176 01/14/2022    TRIG 58 01/14/2022    HDL 53 01/14/2022    CHOLHDL 30.1 01/14/2022     TOTALCHOLEST 3.3 01/14/2022    ALBUMIN 3.9 08/08/2022    BILIDIR 0.1 12/29/2021    AST 46 (H) 08/08/2022    ALT 30 08/08/2022    ALKPHOS 103 08/08/2022    MG 2.4 05/18/2022    INR 1.4 (H) 07/13/2022       Assessment and Plan:  Angela Avalos is a 89y.o. female with mild decompensation of NAFLD-induced Cirrhosis. Her meld remains <15 and her fluid is stable on modest doses of diuretics. She does not need a tips or a liver tx evaluation yet. However, I am concerned about her BMI. The surgeons will be hesitant about transplanting her with a BMI close to 50. I have asked that she lose weight to reach at least a BMI of <40.  Current recommendations:   1. Cirrhosis, decompensated with edema, esophageal varices and possible hepatic hydrothorax: MELD <15. Remains medically early for liver tx. Will check meld labs every 1 month and will refer for liver transplant when MELD >15. Screen for HCC every 6 months with imaging and AFP. Abdo US due 01/22; cirrhosis likely due to NAFLD. Recommend consider liver biopsy to r/o autoimmune hepatitis if liver enzymes increase. Recommend vaccination for hepatitis B.  2. Portal htn and thrombocytopenia. Repeat EGD with Dr Leong to f/u varices in one year (3/23); remain off beta blocker since has not had any bleeding and varices are small and she developed hypotension on this med  3. HE, none: monitor; wean off bzd  4. Edema/pleural effusion, stable - likely hepatic hydrothorax, continue current diuretics; no need for TIPS for now; repeat cxr monthly; abdo US in 3 months to look for fluid  5. Platelet threshold is 60 for use of blood thinners; agree with remaining off blood thinners since plts <60  6. Elevated BMI- lose 35 lbs to target BMI <40.     Return 3 months

## 2022-11-12 DIAGNOSIS — N17.9 AKI (ACUTE KIDNEY INJURY): Primary | ICD-10-CM

## 2022-11-14 ENCOUNTER — TELEPHONE (OUTPATIENT)
Dept: HEPATOLOGY | Facility: CLINIC | Age: 62
End: 2022-11-14
Payer: MEDICARE

## 2022-11-14 NOTE — TELEPHONE ENCOUNTER
----- Message from Logan Rush sent at 11/14/2022  1:29 PM CST -----  Regarding: Returning Call  Patient is returning a call from provider's nurse. She is requesting a call back to discuss further.        Contact: [phone]

## 2022-11-14 NOTE — TELEPHONE ENCOUNTER
----- Message from Aleshia Desir MD sent at 11/12/2022  1:49 PM CST -----  Creatinine is up. Have pt lower lasix to 20 mg and spironolactone to 50 mg daily x 1 week and then repeat labs

## 2022-11-14 NOTE — TELEPHONE ENCOUNTER
Patient returned the call to the clinic. She stated she read the message from Dr Desir 2 days ago with her lab results and decrease her diuretics then.    Message reiterated from Dr Desir------  Creatinine is up. Have pt lower lasix to 20 mg and spironolactone to 50 mg x 1 week and then repeat labs.    Patient stated thank you for the long message. I understand it now. I thought it was my liver but you explained about my kidneys.    Labs are scheduled for Mon 11/21/22 at 11 at at [facility]. Pt stated that will be just fine. I will go early.    Thank you for returning my call. Feel free to reach out to us for any questions or concerns.

## 2022-11-14 NOTE — TELEPHONE ENCOUNTER
----- Message from Aleshia Desir MD -----  Creatinine is up. Have pt lower lasix to 20 mg and spironolactone to 50 mg daily x 1 week and then repeat labs.    Call placed to the home/mobile # [phone]. No Answer. Left detailed VM message from Dr Desir.Cr level is up. Your kidneys are working too hard. You will need to make changes with your Fluid Pills now starting today.  The Lasix or furosemide 40 mg tablet.- Take I/2 tablet 20 mg daily for 1 Week.  The spironolactone or Aldactone 100 mg tablet. - Take 1/2 tablet 50 mg daily for 1 week.    Next Monday 11/21/22 you will need to repeat Labs to check your kidney function.  The Labs are already scheduled at [facility] for 11 am. Please call us back and let us know you received this message from Dr Desir. This is [facility] at [phone] the Office of Dr Desir, [facility] at [facility].    Call placed to the emergency contact, Fabiana Correa sister at [phone]. No Answer. Left message calling from Dr Desir at [facility]. We are trying to get in contact with Angela. We left her a detailed VM message. Please have her call us back at [facility], Dr Desir at [phone] for results.

## 2022-11-22 ENCOUNTER — TELEPHONE (OUTPATIENT)
Dept: HEPATOLOGY | Facility: CLINIC | Age: 62
End: 2022-11-22
Payer: MEDICARE

## 2022-11-22 NOTE — TELEPHONE ENCOUNTER
Patient calling to check on results of her labs  from 11/21 after being instructed to decrease her diuretics for 1 week then repeat Labs.    ------ Message from Dr Desir ------  Labs stable.    Patient stated is know I was suppose to take Lasix 20 and Spironolactone 50 for 1 week, but I was taking it every other day. Your kidney's have recovered.  Are you having any swelling? No, because I was too dry before.    Patient stated I have my US coming up on 12/7/22 that will let me know if I have any fluid (ascites).  Verbalized understanding.

## 2022-12-07 ENCOUNTER — TELEPHONE (OUTPATIENT)
Dept: HEPATOLOGY | Facility: CLINIC | Age: 62
End: 2022-12-07
Payer: MEDICARE

## 2022-12-13 ENCOUNTER — OFFICE VISIT (OUTPATIENT)
Dept: HEPATOLOGY | Facility: CLINIC | Age: 62
End: 2022-12-13
Payer: MEDICARE

## 2022-12-13 DIAGNOSIS — K74.69 OTHER CIRRHOSIS OF LIVER: ICD-10-CM

## 2022-12-13 DIAGNOSIS — I85.10 SECONDARY ESOPHAGEAL VARICES WITHOUT BLEEDING: Primary | ICD-10-CM

## 2022-12-13 PROCEDURE — 3044F PR MOST RECENT HEMOGLOBIN A1C LEVEL <7.0%: ICD-10-PCS | Mod: CPTII,95,, | Performed by: INTERNAL MEDICINE

## 2022-12-13 PROCEDURE — 1160F RVW MEDS BY RX/DR IN RCRD: CPT | Mod: CPTII,95,, | Performed by: INTERNAL MEDICINE

## 2022-12-13 PROCEDURE — 1160F PR REVIEW ALL MEDS BY PRESCRIBER/CLIN PHARMACIST DOCUMENTED: ICD-10-PCS | Mod: CPTII,95,, | Performed by: INTERNAL MEDICINE

## 2022-12-13 PROCEDURE — 99213 PR OFFICE/OUTPT VISIT, EST, LEVL III, 20-29 MIN: ICD-10-PCS | Mod: 95,,, | Performed by: INTERNAL MEDICINE

## 2022-12-13 PROCEDURE — 4010F ACE/ARB THERAPY RXD/TAKEN: CPT | Mod: CPTII,95,, | Performed by: INTERNAL MEDICINE

## 2022-12-13 PROCEDURE — 3044F HG A1C LEVEL LT 7.0%: CPT | Mod: CPTII,95,, | Performed by: INTERNAL MEDICINE

## 2022-12-13 PROCEDURE — 1159F PR MEDICATION LIST DOCUMENTED IN MEDICAL RECORD: ICD-10-PCS | Mod: CPTII,95,, | Performed by: INTERNAL MEDICINE

## 2022-12-13 PROCEDURE — 99213 OFFICE O/P EST LOW 20 MIN: CPT | Mod: 95,,, | Performed by: INTERNAL MEDICINE

## 2022-12-13 PROCEDURE — 1159F MED LIST DOCD IN RCRD: CPT | Mod: CPTII,95,, | Performed by: INTERNAL MEDICINE

## 2022-12-13 PROCEDURE — 4010F PR ACE/ARB THEARPY RXD/TAKEN: ICD-10-PCS | Mod: CPTII,95,, | Performed by: INTERNAL MEDICINE

## 2022-12-13 RX ORDER — METOLAZONE 2.5 MG/1
2.5 TABLET ORAL EVERY OTHER DAY
COMMUNITY
Start: 2022-12-12 | End: 2023-01-13

## 2022-12-13 NOTE — PROGRESS NOTES
The patient location is: home  The chief complaint leading to consultation is: f/u cirrhosis    Visit type: audiovisual    Face to Face time with patient: 15 minutes  30 minutes of total time spent on the encounter, which includes face to face time and non-face to face time preparing to see the patient (eg, review of tests), Obtaining and/or reviewing separately obtained history, Documenting clinical information in the electronic or other health record, Independently interpreting results (not separately reported) and communicating results to the patient/family/caregiver, or Care coordination (not separately reported).         Each patient to whom he or she provides medical services by telemedicine is:  (1) informed of the relationship between the physician and patient and the respective role of any other health care provider with respect to management of the patient; and (2) notified that he or she may decline to receive medical services by telemedicine and may withdraw from such care at any time.    Notes: HEPATOLOGY FOLLOW UP    Referring Physician: Juliann Ma MD  Current Corresponding Physician: Juliann Ma MD, Kenney Montana MD, Delgado Leong MD, Zach Ramírez MD    Angela Avalos is here for follow up of Cirrhosis    HPI  Angela Avalos is a 62 y.o. female with pmhx of DMII, HTN, arthritis, thrombocytopenia (seeing hematologist for w/u), hx sleeve gastrectomy and fatty liver who was referred for evaluation of cirrhosis. I saw her in consultation 12/28/21.     Since 2018 her platelet count has been between 's associated with mild normocytic normochromic anemia hgb 10-11's. In 01/2021 blood work was remarkable for positive GERARD. Imaging shows cirrhosis, presumed to be from her known fatty liver. About 2 months ago she developed edema and was placed on low dose diuretics (lasix 20 mg daily and spironolactone 50 mg daily). She lost 22 lbs with this and her edema is well controlled at  present. She denies HE symptoms. She has not had and EGD to screen for varices.     Labs 12/1/21: plts 56, INR 1.2  12/13/21: creat 0.8, Na 140, ALT 18, aST 40, ALKP 105,  Hemoglobin A1C 4.9, HCV Ab-, HBsAg-, HAV total Ab+, GERARD+ (1:320), ASMA-, iron sat 28%  6/21 AFP 2.7  BMI 39.56 (has lost about 40 lbs)  Alcohol: no significant alcohol     MRI w wo conrast 3/29/21: The liver demonstrates a shrunken nodular contour consistent with cirrhosis.  No focal liver mass is seen.  No abnormal liver enhancement noted.  Previous cholecystectomy.  Mild splenomegaly.  No focal splenic lesion.  No abnormality seen within the pancreas or kidneys; MRCP images demonstrate moderate dilatation of the common duct measuring up to 1.4 cm proximally and tapers to a normal caliber distally.  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 Some of it has come back. Not placed on blood thinner initially but after I saw her she was placed on eliquis but taken off when her plts fell to below 60. She tells me due to the fall in plts she had a bone marrow biopsy. This was normal.    Now walking 2 miles a day. Due to have cataract surgery in jan 2023.    Labs 12/7/22: Tbil 1.7, ALT 27, AST 52, ALKP 101, plts 44  7/13/22: AFP 2.5  Serologic w/u: GERARD+ (1;320), ASMA-, AMA-, TTG IgA-, HCV Ab-, HBsAg-, HBcAb-, HBsAb-, HAV IgG+    Edema/hepatic hydrothorax, ongoing: lasix 20 mg and spironolactone 50 mg; had to take metolazone 2.5 mg  daily for 3 days again 12/22. Cxr 12/7/22 minimal pleural effusion.   Abdo Us 12/7/22 - no liver cancer    EGD 3/30/22: small esophageal varices; sleeve gastrectomy; started on beta blocker but BP plummeted - now off    MELD-Na score: 10 at 12/7/2022  8:49 AM  MELD score: 10 at 12/7/2022  8:49 AM  Calculated from:  Serum Creatinine: 0.82 mg/dL (Using min of 1 mg/dL) at 12/7/2022  8:49 AM  Serum Sodium: 137 mmol/L at 12/7/2022  8:49 AM  Total Bilirubin: 1.7 mg/dL at 12/7/2022  8:49 AM  INR(ratio): 1.2 at 12/7/2022  8:49 AM  Age: 62 years    Outpatient Encounter Medications as of 12/13/2022   Medication Sig Dispense Refill    ALPRAZolam (XANAX) 0.25 MG tablet Take 1 tablet (0.25 mg total) by mouth nightly. 30 tablet 0    apixaban (ELIQUIS) 2.5 mg Tab Take 1 tablet (2.5 mg total) by mouth 2 (two) times daily. 60 tablet 5    atorvastatin (LIPITOR) 10 MG tablet TAKE 1 TABLET BY MOUTH EVERY EVENING FOR CHOLESTEROL 90 tablet 3    blood sugar diagnostic Strp 1 strip by Misc.(Non-Drug; Combo Route) route 2 (two) times a day. 100 each 0    blood-glucose meter Misc 1 kit by Misc.(Non-Drug; Combo Route) route once. for 1 dose 1 each 0    clotrimazole-betamethasone 1-0.05% (LOTRISONE) cream Apply topically.      ergocalciferol (ERGOCALCIFEROL) 50,000 unit Cap Take 1 capsule (50,000 Units total) by mouth every 7 days. 12 capsule 3    ferrous sulfate 325 (65 FE) MG EC tablet Take 1 tablet (325 mg total) by mouth every other day. With orange juice or vitamin c 90 tablet 2    furosemide (LASIX) 40 MG tablet TAKE 1& 1/2 TABLET BY MOUTH EVERY DAY 45 tablet 5    hydrocortisone 2.5 % cream       lancets (LANCETS,THIN) Misc 1 each by Misc.(Non-Drug; Combo Route) route 2 (two) times a day. 100 each 0    mupirocin (BACTROBAN) 2 % ointment       oxyCODONE (ROXICODONE) 15 MG Tab Take 1 tablet (15 mg total) by mouth every 12 (twelve) hours as needed for Pain. Medically Necessary for patient to be on this medication for longer than 7 days. 60  "tablet 0    pantoprazole (PROTONIX) 40 MG tablet TAKE 1 TABLET(40 MG) BY MOUTH EVERY DAY 30 tablet 5    pen needle, diabetic (BD ULTRA-FINE SHORT PEN NEEDLE) 31 gauge x 5/16" Ndle USE ONCE A DAY WITH VICTOZA 100 each 5    PREVIDENT 5000 SENSITIVE 1.1-5 % Pste USE A PEA SIZE AMOUNT IN THE MORNING AND EVENING      sertraline (ZOLOFT) 25 MG tablet Take 1 tablet (25 mg total) by mouth once daily. 30 tablet 11    spironolactone (ALDACTONE) 100 MG tablet TAKE 1 TABLET BY MOUTH EVERY DAY 30 tablet 5    triamcinolone acetonide 0.1% (KENALOG) 0.1 % cream Apply topically 2 (two) times daily. 45 g 1     Facility-Administered Encounter Medications as of 12/13/2022   Medication Dose Route Frequency Provider Last Rate Last Admin    0.9%  NaCl infusion   Intravenous Continuous Delgado Leong MD   Stopped at 03/30/22 0942     Review of patient's allergies indicates:  No Known Allergies  Past Medical History:   Diagnosis Date    Anxiety     BMI 50.0-59.9, adult     Chronic tonsillitis     Clotting disorder     Deep vein thrombosis 2004    right leg    Depression     Diabetes mellitus 2012    insulin    DVT (deep venous thrombosis) 2004    Right leg    Edema 5/10/2022    GERD (gastroesophageal reflux disease)     Hiatal hernia     Hyperlipidemia     Hypertension     Internal hemorrhoids     Lower back pain     Morbid obesity     Personal history of colonic polyps     non-neoplastic    Pleural effusion 5/10/2022    Presumed hepatic hydrothorax    Primary osteoarthritis involving multiple joints 03/13/2018    Stroke     Unspecified cirrhosis of liver        Review of Systems   Constitutional: Negative.    HENT: Negative.     Eyes: Negative.    Respiratory: Negative.     Cardiovascular: Negative.    Gastrointestinal: Negative.    Genitourinary: Negative.    Musculoskeletal: Negative.    Skin: Negative.    Neurological: Negative.    Psychiatric/Behavioral: Negative.       BMI 46  There were no vitals filed for this " visit.          MELD-Na score: 10 at 12/7/2022  8:49 AM  MELD score: 10 at 12/7/2022  8:49 AM  Calculated from:  Serum Creatinine: 0.82 mg/dL (Using min of 1 mg/dL) at 12/7/2022  8:49 AM  Serum Sodium: 137 mmol/L at 12/7/2022  8:49 AM  Total Bilirubin: 1.7 mg/dL at 12/7/2022  8:49 AM  INR(ratio): 1.2 at 12/7/2022  8:49 AM  Age: 62 years    Lab Results   Component Value Date     (H) 12/07/2022    BUN 24 (H) 12/07/2022    CREATININE 0.82 12/07/2022    CALCIUM 8.8 12/07/2022     12/07/2022    K 4.1 12/07/2022    CL 99 12/07/2022    PROT 7.7 12/07/2022    CO2 34 (H) 12/07/2022    ANIONGAP 4 (L) 12/07/2022    WBC 3.60 (L) 12/07/2022    RBC 4.18 12/07/2022    HGB 13.3 12/07/2022    HCT 38.7 12/07/2022    MCV 93 12/07/2022    MCH 31.8 (H) 12/07/2022    MCHC 34.3 12/07/2022     Lab Results   Component Value Date    RDW 14.1 12/07/2022    PLT 44 (LL) 12/07/2022    MPV 7.0 (L) 12/07/2022    GRAN 2.6 12/07/2022    GRAN 73.8 (H) 12/07/2022    LYMPH 0.6 (L) 12/07/2022    LYMPH 17.1 (L) 12/07/2022    MONO 0.2 (L) 12/07/2022    MONO 6.7 12/07/2022    EOSINOPHIL 2.2 12/07/2022    BASOPHIL 0.2 12/07/2022    EOS 0.1 12/07/2022    BASO 0.00 12/07/2022    APTT 30.5 02/18/2021    GROUPTRH A POS 02/27/2014    GERARD POSITIVE (A) 01/29/2021    CHOL 153 12/05/2022    TRIG 122 12/05/2022    HDL 41 12/05/2022    CHOLHDL 26.8 12/05/2022    TOTALCHOLEST 3.7 12/05/2022    ALBUMIN 4.0 12/07/2022    BILIDIR 0.2 12/07/2022    AST 52 (H) 12/07/2022    ALT 27 12/07/2022    ALKPHOS 101 12/07/2022    MG 2.4 05/18/2022    INR 1.2 (H) 12/07/2022       Assessment and Plan:  Angela Avalos is a 62y.o. female with mild decompensation of NAFLD-induced Cirrhosis. Her meld remains <15 and her fluid is stable on modest doses of diuretics. She does not need a tips or a liver tx evaluation yet. However, I am concerned about her BMI. The surgeons will be hesitant about transplanting her with a BMI close to 50. I have asked that she lose weight to reach  at least a BMI of <40. I am excited she is walking 2 miles a day.  Current recommendations:   1. Cirrhosis, decompensated with edema, esophageal varices and possible hepatic hydrothorax: MELD <15. Remains medically early for liver tx. Will check meld labs every 2 months and will refer for liver transplant when MELD >15. Screen for HCC every 6 months with imaging and AFP. Abdo US due 06/23; cirrhosis likely due to NAFLD. Recommend consider liver biopsy to r/o autoimmune hepatitis if liver enzymes increase. Recommend vaccination for hepatitis B.  2. Portal htn and thrombocytopenia. Repeat EGD with Dr Leong to f/u varices in one year (3/23); remain off beta blocker since has not had any bleeding and varices are small and she developed hypotension on this med  3. HE, none: monitor; remain off bzd  4. Edema/pleural effusion, stable - likely hepatic hydrothorax, continue current diuretics; no need for TIPS for now;   5. Platelet threshold is 60 for use of blood thinners; agree with remaining off blood thinners since plts <60  6. Elevated BMI- lose 35 lbs to target BMI <40.     Return 3 months

## 2022-12-13 NOTE — Clinical Note
1. Labs every 8 weeks 2. Abdo US 06/23 3. EGD 03/23 with Dr Sanon 4. Continue current water pills 5. Return 3 months

## 2022-12-13 NOTE — PATIENT INSTRUCTIONS
Labs every 8 weeks  Abdo US 06/23  EGD 03/23 with Dr Sanon  Continue current water pills  Return 3 months

## 2022-12-14 ENCOUNTER — TELEPHONE (OUTPATIENT)
Dept: HEPATOLOGY | Facility: CLINIC | Age: 62
End: 2022-12-14
Payer: MEDICARE

## 2023-03-07 PROBLEM — F43.21 GRIEVING: Status: ACTIVE | Noted: 2023-03-07

## 2023-03-28 ENCOUNTER — OFFICE VISIT (OUTPATIENT)
Dept: HEPATOLOGY | Facility: CLINIC | Age: 63
End: 2023-03-28
Payer: MEDICARE

## 2023-03-28 VITALS
HEIGHT: 61 IN | HEART RATE: 62 BPM | RESPIRATION RATE: 18 BRPM | WEIGHT: 252.63 LBS | TEMPERATURE: 98 F | SYSTOLIC BLOOD PRESSURE: 118 MMHG | DIASTOLIC BLOOD PRESSURE: 86 MMHG | BODY MASS INDEX: 47.7 KG/M2 | OXYGEN SATURATION: 98 %

## 2023-03-28 DIAGNOSIS — R60.9 EDEMA, UNSPECIFIED TYPE: ICD-10-CM

## 2023-03-28 DIAGNOSIS — Z91.81 PERSONAL HISTORY OF FALL: ICD-10-CM

## 2023-03-28 DIAGNOSIS — K74.69 OTHER CIRRHOSIS OF LIVER: Primary | ICD-10-CM

## 2023-03-28 DIAGNOSIS — I85.10 SECONDARY ESOPHAGEAL VARICES WITHOUT BLEEDING: ICD-10-CM

## 2023-03-28 PROCEDURE — 99215 PR OFFICE/OUTPT VISIT, EST, LEVL V, 40-54 MIN: ICD-10-PCS | Mod: S$GLB,,, | Performed by: INTERNAL MEDICINE

## 2023-03-28 PROCEDURE — 3008F BODY MASS INDEX DOCD: CPT | Mod: CPTII,S$GLB,, | Performed by: INTERNAL MEDICINE

## 2023-03-28 PROCEDURE — 3288F FALL RISK ASSESSMENT DOCD: CPT | Mod: CPTII,S$GLB,, | Performed by: INTERNAL MEDICINE

## 2023-03-28 PROCEDURE — 99999 PR PBB SHADOW E&M-EST. PATIENT-LVL V: ICD-10-PCS | Mod: PBBFAC,,, | Performed by: INTERNAL MEDICINE

## 2023-03-28 PROCEDURE — 3074F PR MOST RECENT SYSTOLIC BLOOD PRESSURE < 130 MM HG: ICD-10-PCS | Mod: CPTII,S$GLB,, | Performed by: INTERNAL MEDICINE

## 2023-03-28 PROCEDURE — 1160F PR REVIEW ALL MEDS BY PRESCRIBER/CLIN PHARMACIST DOCUMENTED: ICD-10-PCS | Mod: CPTII,S$GLB,, | Performed by: INTERNAL MEDICINE

## 2023-03-28 PROCEDURE — 3044F HG A1C LEVEL LT 7.0%: CPT | Mod: CPTII,S$GLB,, | Performed by: INTERNAL MEDICINE

## 2023-03-28 PROCEDURE — 99999 PR PBB SHADOW E&M-EST. PATIENT-LVL V: CPT | Mod: PBBFAC,,, | Performed by: INTERNAL MEDICINE

## 2023-03-28 PROCEDURE — 3288F PR FALLS RISK ASSESSMENT DOCUMENTED: ICD-10-PCS | Mod: CPTII,S$GLB,, | Performed by: INTERNAL MEDICINE

## 2023-03-28 PROCEDURE — 1159F PR MEDICATION LIST DOCUMENTED IN MEDICAL RECORD: ICD-10-PCS | Mod: CPTII,S$GLB,, | Performed by: INTERNAL MEDICINE

## 2023-03-28 PROCEDURE — 1101F PR PT FALLS ASSESS DOC 0-1 FALLS W/OUT INJ PAST YR: ICD-10-PCS | Mod: CPTII,S$GLB,, | Performed by: INTERNAL MEDICINE

## 2023-03-28 PROCEDURE — 1160F RVW MEDS BY RX/DR IN RCRD: CPT | Mod: CPTII,S$GLB,, | Performed by: INTERNAL MEDICINE

## 2023-03-28 PROCEDURE — 3079F DIAST BP 80-89 MM HG: CPT | Mod: CPTII,S$GLB,, | Performed by: INTERNAL MEDICINE

## 2023-03-28 PROCEDURE — 99215 OFFICE O/P EST HI 40 MIN: CPT | Mod: S$GLB,,, | Performed by: INTERNAL MEDICINE

## 2023-03-28 PROCEDURE — 3008F PR BODY MASS INDEX (BMI) DOCUMENTED: ICD-10-PCS | Mod: CPTII,S$GLB,, | Performed by: INTERNAL MEDICINE

## 2023-03-28 PROCEDURE — 1159F MED LIST DOCD IN RCRD: CPT | Mod: CPTII,S$GLB,, | Performed by: INTERNAL MEDICINE

## 2023-03-28 PROCEDURE — 3074F SYST BP LT 130 MM HG: CPT | Mod: CPTII,S$GLB,, | Performed by: INTERNAL MEDICINE

## 2023-03-28 PROCEDURE — 1101F PT FALLS ASSESS-DOCD LE1/YR: CPT | Mod: CPTII,S$GLB,, | Performed by: INTERNAL MEDICINE

## 2023-03-28 PROCEDURE — 3079F PR MOST RECENT DIASTOLIC BLOOD PRESSURE 80-89 MM HG: ICD-10-PCS | Mod: CPTII,S$GLB,, | Performed by: INTERNAL MEDICINE

## 2023-03-28 PROCEDURE — 3044F PR MOST RECENT HEMOGLOBIN A1C LEVEL <7.0%: ICD-10-PCS | Mod: CPTII,S$GLB,, | Performed by: INTERNAL MEDICINE

## 2023-03-28 RX ORDER — LACTULOSE 10 G/15ML
20 SOLUTION ORAL 2 TIMES DAILY
Qty: 3000 ML | Refills: 11 | Status: SHIPPED | OUTPATIENT
Start: 2023-03-28 | End: 2023-05-17

## 2023-03-28 NOTE — PATIENT INSTRUCTIONS
Adriana BRASWELL 06/23  EGD now - with Dr Sanon  May need to re-evaluate eliquis- check  with PCP about stopping it  Lactulose trial  Continue the diuretics  Try to lose weight; ozempic ok  Labs every 8 weeks  Return 3-4 months

## 2023-03-28 NOTE — PROGRESS NOTES
HEPATOLOGY FOLLOW UP    Referring Physician: Juliann Ma MD  Current Corresponding Physician: Juliann Ma MD, Kenney Montana MD, Delgado Leong MD, Zach Ramírez MD    Angela Avalos is here for follow up of Cirrhosis    HPI  Angela Avalos is a 62 y.o. female with pmhx of DMII, HTN, arthritis, thrombocytopenia (seeing hematologist for w/u), hx sleeve gastrectomy and fatty liver who was referred for evaluation of cirrhosis. I saw her in consultation 12/28/21.     Since 2018 her platelet count has been between 's associated with mild normocytic normochromic anemia hgb 10-11's. In 01/2021 blood work was remarkable for positive GERARD. Imaging shows cirrhosis, presumed to be from her known fatty liver. About 2 months ago she developed edema and was placed on low dose diuretics (lasix 20 mg daily and spironolactone 50 mg daily). She lost 22 lbs with this and her edema is well controlled at present. She denies HE symptoms. She has not had and EGD to screen for varices.     Labs 12/1/21: plts 56, INR 1.2  12/13/21: creat 0.8, Na 140, ALT 18, aST 40, ALKP 105,  Hemoglobin A1C 4.9, HCV Ab-, HBsAg-, HAV total Ab+, GERARD+ (1:320), ASMA-, iron sat 28%  6/21 AFP 2.7  BMI 39.56 (has lost about 40 lbs)  Alcohol: no significant alcohol     MRI w wo conrast 3/29/21: The liver demonstrates a shrunken nodular contour consistent with cirrhosis.  No focal liver mass is seen.  No abnormal liver enhancement noted.  Previous cholecystectomy.  Mild splenomegaly.  No focal splenic lesion.  No abnormality seen within the pancreas or kidneys; MRCP images demonstrate moderate dilatation of the common duct measuring up to 1.4 cm proximally and tapers to a normal caliber distally.  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 Some of it has come back. Not placed on blood thinner initially but after I saw her she was placed on eliquis but taken off when her plts fell to below 60. She has now been placed back on eliquis since her plts are >60 most recently. She tells me due to the fall in plts she had a bone marrow biopsy. This was normal.    Now walking 2 miles a day. Due to have cataract surgery in 2023. Fell 01/23- hematoma to post scalp.     Labs 3/7/23: Tbil 1.0, ALT 33, AST 49, ALKP 110, plts 44  12/27/22: AFP 2.9  Serologic w/u: GERARD+ (1;320), ASMA-, AMA-, TTG IgA-, HCV Ab-, HBsAg-, HBcAb-, HBsAb-, HAV IgG+    Edema/hepatic hydrothorax, ongoing: lasix 20 mg and spironolactone 50 mg;   Abdo Us 12/7/22 - no liver cancer; small ascites    EGD 3/30/22: small esophageal varices; sleeve gastrectomy; started on beta blocker but BP plummeted - now off    MELD-Na score: 10 at 12/7/2022  8:49 AM  MELD score: 10 at 12/7/2022  8:49 AM  Calculated from:  Serum Creatinine: 0.82 mg/dL (Using min of 1 mg/dL) at 12/7/2022  8:49 AM  Serum Sodium: 137 mmol/L at 12/7/2022  8:49 AM  Total Bilirubin: 1.7 mg/dL at 12/7/2022  8:49 AM  INR(ratio): 1.2 at 12/7/2022  8:49 AM  Age: 62 years    Outpatient Encounter Medications as of 3/28/2023   Medication Sig Dispense Refill    albuterol (ACCUNEB) 0.63 mg/3 mL Nebu USE 1 VIAL IN NEBULIZER THREE TIMES DAILY AS NEEDED FOR COUGH AND WHEEZE 75 mL 1    ALPRAZolam (XANAX) 0.25 MG tablet Take 1 tablet (0.25 mg total) by mouth nightly. 30 tablet 0    apixaban (ELIQUIS) 2.5 mg Tab Take 1 tablet (2.5 mg total) by mouth 2 (two) times daily. 60 tablet 5    atorvastatin (LIPITOR) 10 MG tablet TAKE 1  "TABLET BY MOUTH EVERY EVENING FOR CHOLESTEROL 90 tablet 3    blood sugar diagnostic Strp 1 strip by Misc.(Non-Drug; Combo Route) route 2 (two) times a day. 100 each 0    clotrimazole-betamethasone 1-0.05% (LOTRISONE) cream Apply topically.      ergocalciferol (ERGOCALCIFEROL) 50,000 unit Cap TAKE 1 CAPSULE BY MOUTH EVERY 7 DAYS 12 capsule 3    ferrous sulfate 325 (65 FE) MG EC tablet Take 1 tablet (325 mg total) by mouth every other day. With orange juice or vitamin c 90 tablet 2    furosemide (LASIX) 40 MG tablet TAKE 1& 1/2 TABLET BY MOUTH EVERY DAY (Patient taking differently: Take 20 mg by mouth once daily.) 45 tablet 5    hydrocortisone 2.5 % cream       lancets (LANCETS,THIN) Misc 1 each by Misc.(Non-Drug; Combo Route) route 2 (two) times a day. 100 each 0    mupirocin (BACTROBAN) 2 % ointment       nystatin (MYCOSTATIN) powder Apply topically 4 (four) times daily. 60 g 0    oxyCODONE (ROXICODONE) 15 MG Tab Take 1 tablet (15 mg total) by mouth every 12 (twelve) hours as needed for Pain. 60 tablet 0    pantoprazole (PROTONIX) 40 MG tablet TAKE 1 TABLET(40 MG) BY MOUTH EVERY DAY 30 tablet 5    pen needle, diabetic (BD ULTRA-FINE SHORT PEN NEEDLE) 31 gauge x 5/16" Ndle USE ONCE A DAY WITH VICTOZA 100 each 5    PREVIDENT 5000 SENSITIVE 1.1-5 % Pste USE A PEA SIZE AMOUNT IN THE MORNING AND EVENING      semaglutide (OZEMPIC) 0.25 mg or 0.5 mg(2 mg/1.5 mL) pen injector Inject 0.25 mg into the skin every 7 days. 0.75 mL 11    sertraline (ZOLOFT) 25 MG tablet Take 1 tablet (25 mg total) by mouth once daily. 30 tablet 11    spironolactone (ALDACTONE) 100 MG tablet TAKE 1 TABLET BY MOUTH EVERY DAY (Patient taking differently: Take 50 mg by mouth once daily.) 30 tablet 5    triamcinolone acetonide 0.1% (KENALOG) 0.1 % cream Apply topically 2 (two) times daily. 45 g 1    blood-glucose meter Misc 1 kit by Misc.(Non-Drug; Combo Route) route once. for 1 dose 1 each 0    [DISCONTINUED] apixaban (ELIQUIS) 2.5 mg Tab Take 1 " tablet (2.5 mg total) by mouth 2 (two) times daily. 60 tablet 5    [DISCONTINUED] ergocalciferol (ERGOCALCIFEROL) 50,000 unit Cap Take 1 capsule (50,000 Units total) by mouth every 7 days. 12 capsule 3    [DISCONTINUED] ferrous sulfate 325 (65 FE) MG EC tablet Take 1 tablet (325 mg total) by mouth every other day. With orange juice or vitamin c 90 tablet 2    [DISCONTINUED] oxyCODONE (ROXICODONE) 15 MG Tab Take 1 tablet (15 mg total) by mouth every 12 (twelve) hours as needed for Pain. 60 tablet 0     Facility-Administered Encounter Medications as of 3/28/2023   Medication Dose Route Frequency Provider Last Rate Last Admin    0.9%  NaCl infusion   Intravenous Continuous Delgado Leong MD   Stopped at 03/30/22 0942     Review of patient's allergies indicates:  No Known Allergies  Past Medical History:   Diagnosis Date    Anxiety     BMI 50.0-59.9, adult     Chronic tonsillitis     Clotting disorder     Deep vein thrombosis 2004    right leg    Depression     Diabetes mellitus 2012    insulin    DVT (deep venous thrombosis) 2004    Right leg    Edema 5/10/2022    GERD (gastroesophageal reflux disease)     Hiatal hernia     Hyperlipidemia     Hypertension     Internal hemorrhoids     Lower back pain     Morbid obesity     Personal history of colonic polyps     non-neoplastic    Pleural effusion 5/10/2022    Presumed hepatic hydrothorax    Primary osteoarthritis involving multiple joints 03/13/2018    Stroke     Unspecified cirrhosis of liver        Review of Systems   Constitutional: Negative.    HENT: Negative.     Eyes: Negative.    Respiratory: Negative.     Cardiovascular: Negative.    Gastrointestinal: Negative.    Genitourinary: Negative.    Musculoskeletal: Negative.    Skin: Negative.    Neurological: Negative.    Psychiatric/Behavioral: Negative.       BMI 46  Vitals:    03/28/23 1442   BP: 118/86   Pulse: 62   Resp: 18   Temp: 97.8 °F (36.6 °C)             MELD-Na score: 10 at 12/7/2022  8:49 AM  MELD  score: 10 at 12/7/2022  8:49 AM  Calculated from:  Serum Creatinine: 0.82 mg/dL (Using min of 1 mg/dL) at 12/7/2022  8:49 AM  Serum Sodium: 137 mmol/L at 12/7/2022  8:49 AM  Total Bilirubin: 1.7 mg/dL at 12/7/2022  8:49 AM  INR(ratio): 1.2 at 12/7/2022  8:49 AM  Age: 62 years    Lab Results   Component Value Date     (H) 03/07/2023    BUN 17 03/07/2023    CREATININE 0.81 03/07/2023    CALCIUM 8.7 03/07/2023     03/07/2023    K 3.8 03/07/2023     03/07/2023    PROT 8.1 03/07/2023    CO2 30 (H) 03/07/2023    ANIONGAP 6 (L) 03/07/2023    WBC 4.00 03/07/2023    RBC 4.31 03/07/2023    HGB 13.6 03/07/2023    HCT 39.6 03/07/2023    MCV 92 03/07/2023    MCH 31.5 (H) 03/07/2023    MCHC 34.3 03/07/2023     Lab Results   Component Value Date    RDW 14.6 (H) 03/07/2023    PLT 65 (L) 03/07/2023    MPV 6.7 (L) 03/07/2023    GRAN 2.9 03/07/2023    GRAN 72.7 03/07/2023    LYMPH 0.7 (L) 03/07/2023    LYMPH 17.4 (L) 03/07/2023    MONO 0.3 03/07/2023    MONO 7.0 03/07/2023    EOSINOPHIL 2.6 03/07/2023    BASOPHIL 0.3 03/07/2023    EOS 0.1 03/07/2023    BASO 0.00 03/07/2023    APTT 30.5 02/18/2021    GROUPTRH A POS 02/27/2014    GERARD POSITIVE (A) 01/24/2023    CHOL 153 12/05/2022    TRIG 122 12/05/2022    HDL 41 12/05/2022    CHOLHDL 26.8 12/05/2022    TOTALCHOLEST 3.7 12/05/2022    ALBUMIN 4.1 03/07/2023    BILIDIR 0.2 12/07/2022    AST 49 (H) 03/07/2023    ALT 33 03/07/2023    ALKPHOS 110 03/07/2023    MG 2.4 05/18/2022    INR 1.2 (H) 03/24/2023       Assessment and Plan:  Angela Avalos is a 62y.o. female with mild decompensation of NAFLD-induced Cirrhosis. Her meld remains <15 and her fluid is stable on low doses of diuretics. She does not need a tips or a liver tx evaluation yet. However, I am concerned about her BMI. The surgeons will be hesitant about transplanting her with a BMI close to 50. I have asked that she lose weight to reach at least a BMI of <40. She is due to restart ozempic. I am excited she is  walking 2 miles a day.  Current recommendations:   1. Cirrhosis, decompensated with edema, esophageal varices and possible hepatic hydrothorax: MELD <15. Remains medically early for liver tx. Will check meld labs every 2 months and will refer for liver transplant when MELD >15. Screen for HCC every 6 months with imaging and AFP. Abdo US due 06/23; cirrhosis likely due to NAFLD. Recommend consider liver biopsy to r/o autoimmune hepatitis if liver enzymes increase. Recommend vaccination for hepatitis B.  2. Portal htn and thrombocytopenia. Repeat EGD with Dr Leong to f/u varices in one year (3/23); remain off beta blocker since has not had any bleeding and varices are small and she developed hypotension on this med  3. HE, none: monitor; remain off bzd  4. Edema/pleural effusion, stable - likely hepatic hydrothorax, continue current diuretics; no need for TIPS for now;   5. Platelet threshold is 60 for use of blood thinners; with recent falls and hx varices due for repeat EGD, I am recommending that she remain off eliquis  6. Elevated BMI- lose 35 lbs to target BMI <40.  7. Hx falls: ammonia levels never high; but will give trial of lactulose anyway.     Return 3 months

## 2023-04-24 PROBLEM — K31.84 GASTROPARESIS: Status: ACTIVE | Noted: 2023-04-24

## 2023-05-23 ENCOUNTER — TELEPHONE (OUTPATIENT)
Dept: HEPATOLOGY | Facility: CLINIC | Age: 63
End: 2023-05-23
Payer: MEDICARE

## 2023-05-23 NOTE — TELEPHONE ENCOUNTER
Received call from Delgado with [facility] with a critical lab value.  Platelets 45. Read back and verified.  Results sent to Dr Desir.

## 2023-06-25 PROBLEM — Z91.81 PERSONAL HISTORY OF FALL: Status: RESOLVED | Noted: 2023-03-28 | Resolved: 2023-06-25

## 2023-06-25 PROBLEM — R60.9 EDEMA: Status: RESOLVED | Noted: 2022-05-10 | Resolved: 2023-06-25

## 2023-06-25 PROBLEM — Z71.2 ENCOUNTER TO DISCUSS TEST RESULTS: Status: RESOLVED | Noted: 2022-06-22 | Resolved: 2023-06-25

## 2023-06-25 PROBLEM — Z71.89 ENCOUNTER TO DISCUSS TREATMENT OPTIONS: Status: RESOLVED | Noted: 2022-06-22 | Resolved: 2023-06-25

## 2023-07-11 ENCOUNTER — OFFICE VISIT (OUTPATIENT)
Dept: HEPATOLOGY | Facility: CLINIC | Age: 63
End: 2023-07-11
Payer: MEDICARE

## 2023-07-11 VITALS
TEMPERATURE: 98 F | BODY MASS INDEX: 44.49 KG/M2 | OXYGEN SATURATION: 97 % | DIASTOLIC BLOOD PRESSURE: 41 MMHG | WEIGHT: 241.75 LBS | RESPIRATION RATE: 19 BRPM | HEART RATE: 64 BPM | HEIGHT: 62 IN | SYSTOLIC BLOOD PRESSURE: 99 MMHG

## 2023-07-11 DIAGNOSIS — I85.10 SECONDARY ESOPHAGEAL VARICES WITHOUT BLEEDING: ICD-10-CM

## 2023-07-11 DIAGNOSIS — K74.69 OTHER CIRRHOSIS OF LIVER: Primary | ICD-10-CM

## 2023-07-11 PROCEDURE — 3074F SYST BP LT 130 MM HG: CPT | Mod: CPTII,S$GLB,, | Performed by: INTERNAL MEDICINE

## 2023-07-11 PROCEDURE — 1159F PR MEDICATION LIST DOCUMENTED IN MEDICAL RECORD: ICD-10-PCS | Mod: CPTII,S$GLB,, | Performed by: INTERNAL MEDICINE

## 2023-07-11 PROCEDURE — 3008F PR BODY MASS INDEX (BMI) DOCUMENTED: ICD-10-PCS | Mod: CPTII,S$GLB,, | Performed by: INTERNAL MEDICINE

## 2023-07-11 PROCEDURE — 1101F PR PT FALLS ASSESS DOC 0-1 FALLS W/OUT INJ PAST YR: ICD-10-PCS | Mod: CPTII,S$GLB,, | Performed by: INTERNAL MEDICINE

## 2023-07-11 PROCEDURE — 99214 PR OFFICE/OUTPT VISIT, EST, LEVL IV, 30-39 MIN: ICD-10-PCS | Mod: S$GLB,,, | Performed by: INTERNAL MEDICINE

## 2023-07-11 PROCEDURE — 3074F PR MOST RECENT SYSTOLIC BLOOD PRESSURE < 130 MM HG: ICD-10-PCS | Mod: CPTII,S$GLB,, | Performed by: INTERNAL MEDICINE

## 2023-07-11 PROCEDURE — 3044F PR MOST RECENT HEMOGLOBIN A1C LEVEL <7.0%: ICD-10-PCS | Mod: CPTII,S$GLB,, | Performed by: INTERNAL MEDICINE

## 2023-07-11 PROCEDURE — 3078F PR MOST RECENT DIASTOLIC BLOOD PRESSURE < 80 MM HG: ICD-10-PCS | Mod: CPTII,S$GLB,, | Performed by: INTERNAL MEDICINE

## 2023-07-11 PROCEDURE — 1160F PR REVIEW ALL MEDS BY PRESCRIBER/CLIN PHARMACIST DOCUMENTED: ICD-10-PCS | Mod: CPTII,S$GLB,, | Performed by: INTERNAL MEDICINE

## 2023-07-11 PROCEDURE — 99214 OFFICE O/P EST MOD 30 MIN: CPT | Mod: S$GLB,,, | Performed by: INTERNAL MEDICINE

## 2023-07-11 PROCEDURE — 1159F MED LIST DOCD IN RCRD: CPT | Mod: CPTII,S$GLB,, | Performed by: INTERNAL MEDICINE

## 2023-07-11 PROCEDURE — 3008F BODY MASS INDEX DOCD: CPT | Mod: CPTII,S$GLB,, | Performed by: INTERNAL MEDICINE

## 2023-07-11 PROCEDURE — 1101F PT FALLS ASSESS-DOCD LE1/YR: CPT | Mod: CPTII,S$GLB,, | Performed by: INTERNAL MEDICINE

## 2023-07-11 PROCEDURE — 4010F ACE/ARB THERAPY RXD/TAKEN: CPT | Mod: CPTII,S$GLB,, | Performed by: INTERNAL MEDICINE

## 2023-07-11 PROCEDURE — 3078F DIAST BP <80 MM HG: CPT | Mod: CPTII,S$GLB,, | Performed by: INTERNAL MEDICINE

## 2023-07-11 PROCEDURE — 1160F RVW MEDS BY RX/DR IN RCRD: CPT | Mod: CPTII,S$GLB,, | Performed by: INTERNAL MEDICINE

## 2023-07-11 PROCEDURE — 3288F FALL RISK ASSESSMENT DOCD: CPT | Mod: CPTII,S$GLB,, | Performed by: INTERNAL MEDICINE

## 2023-07-11 PROCEDURE — 3288F PR FALLS RISK ASSESSMENT DOCUMENTED: ICD-10-PCS | Mod: CPTII,S$GLB,, | Performed by: INTERNAL MEDICINE

## 2023-07-11 PROCEDURE — 3044F HG A1C LEVEL LT 7.0%: CPT | Mod: CPTII,S$GLB,, | Performed by: INTERNAL MEDICINE

## 2023-07-11 PROCEDURE — 99999 PR PBB SHADOW E&M-EST. PATIENT-LVL V: ICD-10-PCS | Mod: PBBFAC,,, | Performed by: INTERNAL MEDICINE

## 2023-07-11 PROCEDURE — 99999 PR PBB SHADOW E&M-EST. PATIENT-LVL V: CPT | Mod: PBBFAC,,, | Performed by: INTERNAL MEDICINE

## 2023-07-11 PROCEDURE — 4010F PR ACE/ARB THEARPY RXD/TAKEN: ICD-10-PCS | Mod: CPTII,S$GLB,, | Performed by: INTERNAL MEDICINE

## 2023-07-11 RX ORDER — CARVEDILOL 3.12 MG/1
3.12 TABLET ORAL 2 TIMES DAILY WITH MEALS
COMMUNITY
End: 2023-10-02 | Stop reason: SDUPTHER

## 2023-07-11 NOTE — PATIENT INSTRUCTIONS
Labs every 12 weeks  Abdo US 12/23  EGD end of July to see if need more banding  Continue diuretics  Return after 12/23 abdo US  No eliquis  See pcp re BP and various BP medicines

## 2023-07-11 NOTE — PROGRESS NOTES
HEPATOLOGY FOLLOW UP    Referring Physician: Juliann Ma MD  Current Corresponding Physician: Juliann Ma MD, Kenney Montana MD, Delgado Leong MD, Zach Ramírez MD    Angela Avalos is here for follow up of Cirrhosis    HPI  Angela Avalos is a 63 y.o. female with pmhx of DMII, HTN, arthritis, thrombocytopenia (seeing hematologist for w/u), hx sleeve gastrectomy and fatty liver who was referred for evaluation of cirrhosis. I saw her in consultation 12/28/21.     Since 2018 her platelet count has been between 's associated with mild normocytic normochromic anemia hgb 10-11's. In 01/2021 blood work was remarkable for positive GERARD. Imaging shows cirrhosis, presumed to be from her known fatty liver. About 2 months ago she developed edema and was placed on low dose diuretics (lasix 20 mg daily and spironolactone 50 mg daily). She lost 22 lbs with this and her edema is well controlled at present. She denies HE symptoms. She has not had and EGD to screen for varices.     Labs 12/1/21: plts 56, INR 1.2  12/13/21: creat 0.8, Na 140, ALT 18, aST 40, ALKP 105,  Hemoglobin A1C 4.9, HCV Ab-, HBsAg-, HAV total Ab+, GERARD+ (1:320), ASMA-, iron sat 28%  6/21 AFP 2.7  BMI 39.56 (has lost about 40 lbs)  Alcohol: no significant alcohol     MRI w wo conrast 3/29/21: The liver demonstrates a shrunken nodular contour consistent with cirrhosis.  No focal liver mass is seen.  No abnormal liver enhancement noted.  Previous cholecystectomy.  Mild splenomegaly.  No focal splenic lesion.  No abnormality seen within the pancreas or kidneys; MRCP images demonstrate moderate dilatation of the common duct measuring up to 1.4 cm proximally and tapers to a normal caliber distally.  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 Some of it has come back. Not placed on blood thinner initially but after I saw her she was placed on eliquis but taken off when her plts fell to below 60. She has now been placed back on eliquis since her plts are >60 most recently. She tells me due to the fall in plts she had a bone marrow biopsy. This was normal.    Now walking 2 miles a day. Still due to have cataract surgery 7/2023. Fell 01/23- hematoma to post scalp.     Labs 6/30/23: Tbil 0.9, ALT 26, AST 43, ALKP 74, plts 53, AFP 1.9  Serologic w/u: GERARD+ (1;320), ASMA-, AMA-, TTG IgA-, HCV Ab-, HBsAg-, HBcAb-, HBsAb-, HAV IgG+    Edema/hepatic hydrothorax, ongoing: lasix 20 mg and spironolactone 50 mg;   Abdo Us 12/7/22 - no liver cancer; right pleural effusion    EGD 6/15/23: large varices-banded; due for repeat in 4 weeks; coreg started    MELD-Na: 9 at 6/30/2023  7:34 AM  MELD: 9 at 6/30/2023  7:34 AM  Calculated from:  Serum Creatinine: 0.79 mg/dL (Using min of 1 mg/dL) at 6/30/2023  7:34 AM  Serum Sodium: 139 mmol/L (Using max of 137 mmol/L) at 6/30/2023  7:34 AM  Total Bilirubin: 0.9 mg/dL (Using min of 1 mg/dL) at 6/30/2023  7:34 AM  INR(ratio): 1.3 at 6/30/2023  7:34 AM      Outpatient Encounter Medications as of 7/11/2023   Medication Sig Dispense Refill    atorvastatin (LIPITOR) 10 MG tablet TAKE 1 TABLET BY MOUTH EVERY EVENING FOR CHOLESTEROL 90 tablet 3    benazepriL (LOTENSIN) 10 MG tablet Take 1 tablet (10 mg total) by mouth once daily. 90 tablet 1    blood sugar diagnostic Strp 1 strip by Misc.(Non-Drug; Combo Route) route 2 (two) times a day. 100 each 0    blood-glucose meter Misc 1 kit by Misc.(Non-Drug; Combo Route)  "route once. for 1 dose 1 each 0    ergocalciferol (ERGOCALCIFEROL) 50,000 unit Cap TAKE 1 CAPSULE BY MOUTH EVERY 7 DAYS 12 capsule 3    ferrous sulfate 325 (65 FE) MG EC tablet Take 1 tablet (325 mg total) by mouth every other day. With orange juice or vitamin c 90 tablet 2    furosemide (LASIX) 40 MG tablet Take 1 tablet (40 mg total) by mouth once daily. (Patient taking differently: Take 40 mg by mouth once daily. Takes 1/2 daily 20 mg) 30 tablet 5    lancets (LANCETS,THIN) Misc 1 each by Misc.(Non-Drug; Combo Route) route 2 (two) times a day. 100 each 0    oxyCODONE (ROXICODONE) 15 MG Tab Take 1 tablet (15 mg total) by mouth every 12 (twelve) hours as needed for Pain. 60 tablet 0    pantoprazole (PROTONIX) 40 MG tablet TAKE 1 TABLET(40 MG) BY MOUTH EVERY DAY 30 tablet 5    pen needle, diabetic (BD ULTRA-FINE SHORT PEN NEEDLE) 31 gauge x 5/16" Ndle USE ONCE A DAY WITH VICTOZA 100 each 5    semaglutide (OZEMPIC) 0.25 mg or 0.5 mg(2 mg/1.5 mL) pen injector Inject 0.25 mg into the skin every 7 days. 0.75 mL 11    spironolactone (ALDACTONE) 100 MG tablet Take 1 tablet (100 mg total) by mouth once daily. (Patient taking differently: Take 50 mg by mouth once daily. Takes half of a 100 mg pill daily) 30 tablet 5    sucralfate (CARAFATE) 100 mg/mL suspension Take 10 mLs (1 g total) by mouth 4 (four) times daily. (Patient taking differently: Take 1 g by mouth Daily.) 600 mL 0    triamcinolone acetonide 0.1% (KENALOG) 0.1 % cream Apply topically 2 (two) times daily. 45 g 1    clotrimazole-betamethasone 1-0.05% (LOTRISONE) cream Apply topically.      hydrocortisone 2.5 % cream       [] metoclopramide HCl (REGLAN) 10 MG tablet Take 1 tablet (10 mg total) by mouth every evening. for 14 days 14 tablet 0    metoclopramide HCl (REGLAN) 10 MG tablet Take 10 mg by mouth Daily.      mupirocin (BACTROBAN) 2 % ointment       nystatin (MYCOSTATIN) powder Apply topically 4 (four) times daily. (Patient not taking: Reported on " 7/11/2023) 60 g 0    PREVIDENT 5000 SENSITIVE 1.1-5 % Pste USE A PEA SIZE AMOUNT IN THE MORNING AND EVENING      scopolamine (TRANSDERM-SCOP) 1.3-1.5 mg (1 mg over 3 days) Place 1 patch onto the skin every 72 hours. (Patient not taking: Reported on 7/11/2023) 3 patch 0    [DISCONTINUED] benazepriL (LOTENSIN) 10 MG tablet Take 10 mg by mouth once daily.      [DISCONTINUED] furosemide (LASIX) 40 MG tablet TAKE 1& 1/2 TABLET BY MOUTH EVERY DAY (Patient taking differently: Take 20 mg by mouth once daily.) 45 tablet 5    [DISCONTINUED] oxyCODONE (ROXICODONE) 15 MG Tab Take 1 tablet (15 mg total) by mouth every 12 (twelve) hours as needed for Pain. 60 tablet 0    [DISCONTINUED] spironolactone (ALDACTONE) 100 MG tablet TAKE 1 TABLET BY MOUTH EVERY DAY (Patient taking differently: Take 50 mg by mouth once daily.) 30 tablet 5     Facility-Administered Encounter Medications as of 7/11/2023   Medication Dose Route Frequency Provider Last Rate Last Admin    0.9%  NaCl infusion   Intravenous Continuous Delgado Leong MD 0 mL/hr at 03/30/22 0942 New Bag at 06/15/23 0913    0.9%  NaCl infusion   Intravenous Continuous Delgado Leong MD   Stopped at 05/18/23 0910    0.9%  NaCl infusion   Intravenous Continuous Delgado Leong MD         Review of patient's allergies indicates:  No Known Allergies  Past Medical History:   Diagnosis Date    Anxiety     BMI 50.0-59.9, adult     Chronic tonsillitis     Clotting disorder     COVID-19 vaccine administered     2/25/21, 3/18/21, 12/12/21    Deep vein thrombosis 2004    right leg    Depression     Diabetes mellitus 2012    insulin    DVT (deep venous thrombosis) 2004    Right leg    Edema 05/10/2022    Esophageal varices     GERD (gastroesophageal reflux disease)     Hiatal hernia     Hyperlipidemia     Hypertension     Internal hemorrhoids     Lower back pain     Morbid obesity     Osteoarthritis     Personal history of colonic polyps     non-neoplastic    Personal history of  fall 03/28/2023    Pleural effusion 05/10/2022    Presumed hepatic hydrothorax    Primary osteoarthritis involving multiple joints 03/13/2018    Stroke     Tonsil and adenoid disease, chronic     Unspecified cirrhosis of liver        Review of Systems   Constitutional: Negative.    HENT: Negative.     Eyes: Negative.    Respiratory: Negative.     Cardiovascular: Negative.    Gastrointestinal: Negative.    Genitourinary: Negative.    Musculoskeletal: Negative.    Skin: Negative.    Neurological: Negative.    Psychiatric/Behavioral: Negative.       BMI 46  Vitals:    07/11/23 0949   BP: (!) 99/41   Pulse: 64   Resp: 19   Temp: 98.1 °F (36.7 °C)       Physical Exam  Constitutional:       Appearance: Normal appearance.   Eyes:      General: No scleral icterus.  Cardiovascular:      Rate and Rhythm: Normal rate.      Pulses: Normal pulses.   Pulmonary:      Effort: Pulmonary effort is normal.   Abdominal:      General: Abdomen is flat. There is no distension.      Palpations: Abdomen is soft.      Tenderness: There is no abdominal tenderness.   Skin:     General: Skin is warm and dry.   Neurological:      Mental Status: She is alert and oriented to person, place, and time.   Psychiatric:         Mood and Affect: Mood normal.          MELD-Na: 9 at 6/30/2023  7:34 AM  MELD: 9 at 6/30/2023  7:34 AM  Calculated from:  Serum Creatinine: 0.79 mg/dL (Using min of 1 mg/dL) at 6/30/2023  7:34 AM  Serum Sodium: 139 mmol/L (Using max of 137 mmol/L) at 6/30/2023  7:34 AM  Total Bilirubin: 0.9 mg/dL (Using min of 1 mg/dL) at 6/30/2023  7:34 AM  INR(ratio): 1.3 at 6/30/2023  7:34 AM      Lab Results   Component Value Date     (H) 06/30/2023    BUN 19 (H) 06/30/2023    CREATININE 0.79 06/30/2023    CALCIUM 8.5 06/30/2023     06/30/2023    K 4.6 06/30/2023     06/30/2023    PROT 7.0 06/30/2023    CO2 28 06/30/2023    ANIONGAP 7 (L) 06/30/2023    WBC 3.50 (L) 06/30/2023    RBC 3.73 (L) 06/30/2023    HGB 12.2  06/30/2023    HCT 34.5 (L) 06/30/2023    MCV 93 06/30/2023    MCH 32.6 (H) 06/30/2023    MCHC 35.3 06/30/2023     Lab Results   Component Value Date    RDW 14.6 (H) 06/30/2023    PLT 53 (L) 06/30/2023    MPV 7.4 06/30/2023    GRAN 2.4 06/30/2023    GRAN 67.4 06/30/2023    LYMPH 0.8 (L) 06/30/2023    LYMPH 22.0 06/30/2023    MONO 0.2 (L) 06/30/2023    MONO 7.0 06/30/2023    EOSINOPHIL 3.0 06/30/2023    BASOPHIL 0.6 06/30/2023    EOS 0.1 06/30/2023    BASO 0.00 06/30/2023    APTT 30.5 02/18/2021    GROUPTRH A POS 02/27/2014    GERARD POSITIVE (A) 01/24/2023    CHOL 153 12/05/2022    TRIG 122 12/05/2022    HDL 41 12/05/2022    CHOLHDL 26.8 12/05/2022    TOTALCHOLEST 3.7 12/05/2022    ALBUMIN 3.6 06/30/2023    BILIDIR 0.3 05/23/2023    AST 43 (H) 06/30/2023    ALT 26 06/30/2023    ALKPHOS 74 06/30/2023    MG 2.4 05/18/2022    INR 1.3 (H) 06/30/2023       Assessment and Plan:  Angela Avalos is a 63 y.o. female with mild decompensation of NAFLD-induced Cirrhosis. Her meld remains <15 and her fluid is stable on low doses of diuretics. She does not need a tips or a liver tx evaluation yet. However, I am concerned about her BMI. The surgeons will be hesitant about transplanting her with a BMI close to 50. I have asked that she lose weight to reach at least a BMI of <40. She is due to restart ozempic. I am excited she is walking 2 miles a day.  Current recommendations:   1. Cirrhosis, decompensated with edema, esophageal varices and possible hepatic hydrothorax: MELD <15. Remains medically early for liver tx. Will check meld labs every 3 months and will refer for liver transplant when MELD >15. Screen for HCC every 6 months with imaging and AFP. Abdo US due 12/23; cirrhosis likely due to NAFLD. Recommend consider liver biopsy to r/o autoimmune hepatitis if liver enzymes increase. Recommend vaccination for hepatitis B.  2. Portal htn and thrombocytopenia. Large varices banded 06/23- repeat in 4 weeks by local MD  3. HE, none:  monitor; remain off bzd  4. Edema/pleural effusion, stable - likely hepatic hydrothorax, continue current diuretics; no need for TIPS for now;   5. Platelet threshold is 60 for use of blood thinners; with recent falls and hx varices due for repeat EGD, I am recommending that she remain off eliquis  6. Elevated BMI- lose 35 lbs to target BMI <40.  7. Hx falls: ammonia levels never high; but will give trial of lactulose anyway.     Return 3 months

## 2023-09-01 PROBLEM — Z71.9 ENCOUNTER FOR HEALTH EDUCATION: Status: ACTIVE | Noted: 2023-09-01

## 2024-01-08 PROBLEM — M17.11 ARTHRITIS OF RIGHT KNEE: Status: ACTIVE | Noted: 2024-01-08

## 2024-01-23 ENCOUNTER — OFFICE VISIT (OUTPATIENT)
Dept: HEPATOLOGY | Facility: CLINIC | Age: 64
End: 2024-01-23
Payer: MEDICARE

## 2024-01-23 VITALS
WEIGHT: 260.81 LBS | HEART RATE: 64 BPM | DIASTOLIC BLOOD PRESSURE: 64 MMHG | HEIGHT: 62 IN | RESPIRATION RATE: 18 BRPM | SYSTOLIC BLOOD PRESSURE: 124 MMHG | OXYGEN SATURATION: 99 % | BODY MASS INDEX: 47.99 KG/M2

## 2024-01-23 DIAGNOSIS — I85.10 SECONDARY ESOPHAGEAL VARICES WITHOUT BLEEDING: ICD-10-CM

## 2024-01-23 DIAGNOSIS — D69.6 THROMBOCYTOPENIA: Primary | Chronic | ICD-10-CM

## 2024-01-23 DIAGNOSIS — D50.9 IRON DEFICIENCY ANEMIA, UNSPECIFIED IRON DEFICIENCY ANEMIA TYPE: ICD-10-CM

## 2024-01-23 DIAGNOSIS — J90 PLEURAL EFFUSION: ICD-10-CM

## 2024-01-23 DIAGNOSIS — K74.69 OTHER CIRRHOSIS OF LIVER: ICD-10-CM

## 2024-01-23 PROCEDURE — 99215 OFFICE O/P EST HI 40 MIN: CPT | Mod: S$GLB,,, | Performed by: INTERNAL MEDICINE

## 2024-01-23 PROCEDURE — 99999 PR PBB SHADOW E&M-EST. PATIENT-LVL V: CPT | Mod: PBBFAC,,, | Performed by: INTERNAL MEDICINE

## 2024-01-23 NOTE — PATIENT INSTRUCTIONS
Labs every 3 months  Continue diuretics at 1/2 dose  Liver cancer screening with abdo US 6/24  EGD 7/24  Return 4 months

## 2024-01-23 NOTE — PROGRESS NOTES
HEPATOLOGY FOLLOW UP    Referring Physician: Juliann Ma MD  Current Corresponding Physician: Juliann Ma MD, Kenney Montana MD, Delgado Leong MD, Zach Ramírez MD    Angela Avalos is here for follow up of Cirrhosis    HPI  Angela Avalos is a 63 y.o. female with pmhx of DMII, HTN, arthritis, thrombocytopenia (seeing hematologist for w/u), hx sleeve gastrectomy and fatty liver who was referred for evaluation of cirrhosis. I saw her in consultation 12/28/21.     Since 2018 her platelet count has been between 's associated with mild normocytic normochromic anemia hgb 10-11's. In 01/2021 blood work was remarkable for positive GERARD. Imaging shows cirrhosis, presumed to be from her known fatty liver. About 2 months ago she developed edema and was placed on low dose diuretics (lasix 20 mg daily and spironolactone 50 mg daily). She lost 22 lbs with this and her edema is well controlled at present. She denies HE symptoms. She has not had and EGD to screen for varices.     Labs 12/1/21: plts 56, INR 1.2  12/13/21: creat 0.8, Na 140, ALT 18, aST 40, ALKP 105,  Hemoglobin A1C 4.9, HCV Ab-, HBsAg-, HAV total Ab+, GERARD+ (1:320), ASMA-, iron sat 28%  6/21 AFP 2.7  BMI 39.56 (has lost about 40 lbs)  Alcohol: no significant alcohol     MRI w wo conrast 3/29/21: The liver demonstrates a shrunken nodular contour consistent with cirrhosis.  No focal liver mass is seen.  No abnormal liver enhancement noted.  Previous cholecystectomy.  Mild splenomegaly.  No focal splenic lesion.  No abnormality seen within the pancreas or kidneys; MRCP images demonstrate moderate dilatation of the common duct measuring up to 1.4 cm proximally and tapers to a normal caliber distally.  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 Some of it has come back. Not placed on blood thinner initially but after I saw her she was placed on eliquis but taken off when her plts fell to below 60. She has now been placed back on eliquis since her plts are >60 most recently. She tells me due to the fall in plts she had a bone marrow biopsy. This was normal.    --Now walking 2 miles a day  --cataract surgery 7/2023.     Labs 1/18/24: Tbil 0.9, ALT 21, AST 40, ALKP 80, plts 51  12/7/23: AFP 1.9  Serologic w/u: GERARD+ (1;320), ASMA-, AMA-, TTG IgA-, HCV Ab-, HBsAg-, HBcAb-, HBsAb-, HAV IgG+    Edema/hepatic hydrothorax, ongoing: lasix 20 mg and spironolactone 50 mg;   Abdo Us 12/7/23 - no liver cancer    EGD 7/13/23: small varices-no banding; repeat due 7/24  EGD 6/15/23: large varices-banded; due for repeat in 4 weeks; coreg started    MELD 3.0: 12 at 1/3/2024  7:36 AM  MELD-Na: 11 at 1/3/2024  7:36 AM  Calculated from:  Serum Creatinine: 0.90 mg/dL (Using min of 1 mg/dL) at 1/3/2024  7:36 AM  Serum Sodium: 142 mmol/L (Using max of 137 mmol/L) at 1/3/2024  7:36 AM  Total Bilirubin: 1.4 mg/dL at 1/3/2024  7:36 AM  Serum Albumin: 3.8 g/dL (Using max of 3.5 g/dL) at 1/3/2024  7:36 AM  INR(ratio): 1.4 at 1/3/2024  7:36 AM  Age at listing (hypothetical): 63 years  Sex: Female at 1/3/2024  7:36 AM      Outpatient Encounter Medications as of 1/23/2024   Medication Sig Dispense Refill    atorvastatin (LIPITOR) 10 MG tablet Take 1 tablet (10 mg total) by mouth every evening. 90 tablet 3    blood sugar diagnostic (ONETOUCH VERIO TEST STRIPS) Strp USE STRIP TO CHECK BLOOD SUGAR TWICE DAILY. 100 strip 0    blood-glucose meter Misc 1 kit by  "Misc.(Non-Drug; Combo Route) route once. for 1 dose 1 each 0    carvediloL (COREG) 3.125 MG tablet Take 1 tablet (3.125 mg total) by mouth 2 (two) times daily with meals. 60 tablet 5    diclofenac sodium (VOLTAREN) 1 % Gel Apply to affected joints 2-3 times daily 100 g 3    ergocalciferol (ERGOCALCIFEROL) 50,000 unit Cap Take 1 capsule (50,000 Units total) by mouth every 7 days. 12 capsule 3    ferrous sulfate 325 (65 FE) MG EC tablet Take 1 tablet (325 mg total) by mouth every other day. With orange juice or vitamin c 90 tablet 2    furosemide (LASIX) 40 MG tablet Take 1 tablet (40 mg total) by mouth once daily. Takes 1/2 daily 20 mg 30 tablet 5    hydrocortisone 2.5 % cream       lancets (ONETOUCH DELICA PLUS LANCET) 33 gauge Misc USE 1 LANCET TO TEST BLOOD SUGAR TWICE DAILY. 100 each 0    oxyCODONE (ROXICODONE) 15 MG Tab Take 1 tablet (15 mg total) by mouth every 12 (twelve) hours as needed for Pain. 60 tablet 0    pantoprazole (PROTONIX) 40 MG tablet TAKE ONE TABLET BY MOUTH ONCE DAILY 30 tablet 2    pen needle, diabetic (BD ULTRA-FINE SHORT PEN NEEDLE) 31 gauge x 5/16" Ndle USE ONCE A DAY WITH VICTOZA 100 each 5    PREVIDENT 5000 SENSITIVE 1.1-5 % Pste USE A PEA SIZE AMOUNT IN THE MORNING AND EVENING      semaglutide (OZEMPIC) 0.25 mg or 0.5 mg(2 mg/1.5 mL) pen injector Inject 0.25 mg into the skin every 7 days. 0.75 mL 11    spironolactone (ALDACTONE) 100 MG tablet TAKE ONE TABLET BY MOUTH ONCE DAILY (Patient taking differently: Take 50 mg by mouth. Only takes 1/2 daily) 30 tablet 2    triamcinolone acetonide 0.1% (KENALOG) 0.1 % cream Apply topically 2 (two) times daily. 45 g 1    benazepriL (LOTENSIN) 10 MG tablet Take 1 tablet (10 mg total) by mouth once daily. (Patient not taking: Reported on 1/23/2024) 90 tablet 1    clotrimazole-betamethasone 1-0.05% (LOTRISONE) cream Apply topically.      metoclopramide HCl (REGLAN) 10 MG tablet TAKE ONE TABLET BY MOUTH ONCE DAILY (Patient not taking: Reported on " 1/23/2024) 30 tablet 2    mupirocin (BACTROBAN) 2 % ointment       nystatin (NYSTOP) powder Apply topically 2 (two) times daily. (Patient not taking: Reported on 1/23/2024) 60 g 2    sucralfate (CARAFATE) 100 mg/mL suspension Take 10 mLs (1 g total) by mouth 4 (four) times daily. (Patient not taking: Reported on 1/23/2024) 600 mL 0    [DISCONTINUED] metoclopramide HCl (REGLAN) 10 MG tablet Take 1 tablet (10 mg total) by mouth Daily. 30 tablet 3    [DISCONTINUED] oxyCODONE (ROXICODONE) 15 MG Tab Take 1 tablet (15 mg total) by mouth every 12 (twelve) hours as needed for Pain. 60 tablet 0    [DISCONTINUED] pantoprazole (PROTONIX) 40 MG tablet TAKE 1 TABLET(40 MG) BY MOUTH EVERY DAY 30 tablet 5    [DISCONTINUED] semaglutide (OZEMPIC) 0.25 mg or 0.5 mg(2 mg/1.5 mL) pen injector Inject 0.25 mg into the skin every 7 days. 0.75 mL 11    [DISCONTINUED] spironolactone (ALDACTONE) 100 MG tablet Take 1 tablet (100 mg total) by mouth once daily. 30 tablet 5     Facility-Administered Encounter Medications as of 1/23/2024   Medication Dose Route Frequency Provider Last Rate Last Admin    0.9%  NaCl infusion   Intravenous Continuous Delgado Leong MD 0 mL/hr at 03/30/22 0942 New Bag at 06/15/23 0913    0.9%  NaCl infusion   Intravenous Continuous Delgado Leong MD   Stopped at 05/18/23 0910    0.9%  NaCl infusion   Intravenous Continuous Delgado Leong MD        0.9%  NaCl infusion   Intravenous Continuous Delgado Leong MD 20 mL/hr at 07/13/23 0645 New Bag at 07/13/23 0645     Review of patient's allergies indicates:  No Known Allergies  Past Medical History:   Diagnosis Date    Anxiety     BMI 50.0-59.9, adult     Chronic tonsillitis     Clotting disorder     COVID-19 vaccine administered     2/25/21, 3/18/21, 12/12/21    Deep vein thrombosis 2004    right leg    Depression     Diabetes mellitus 2012    insulin    DVT (deep venous thrombosis) 2004    Right leg    Edema 05/10/2022    Esophageal varices      GERD (gastroesophageal reflux disease)     Hiatal hernia     History of sleeve gastrectomy     Hyperlipidemia     Hypertension     Internal hemorrhoids     Lower back pain     Morbid obesity     Osteoarthritis     Personal history of colonic polyps     non-neoplastic    Personal history of fall 03/28/2023    Pleural effusion 05/10/2022    Presumed hepatic hydrothorax    Portal hypertensive gastropathy     Primary osteoarthritis involving multiple joints 03/13/2018    Stroke     Tonsil and adenoid disease, chronic     Unspecified cirrhosis of liver        Review of Systems   Constitutional: Negative.    HENT: Negative.     Eyes: Negative.    Respiratory: Negative.     Cardiovascular: Negative.    Gastrointestinal: Negative.    Genitourinary: Negative.    Musculoskeletal: Negative.    Skin: Negative.    Neurological: Negative.    Psychiatric/Behavioral: Negative.         BMI 46  Vitals:    01/23/24 1045   BP: 124/64   Pulse: 64   Resp: 18       Physical Exam  Constitutional:       Appearance: Normal appearance.   Eyes:      General: No scleral icterus.  Cardiovascular:      Rate and Rhythm: Normal rate.      Pulses: Normal pulses.   Pulmonary:      Effort: Pulmonary effort is normal.   Abdominal:      General: Abdomen is flat. There is no distension.      Palpations: Abdomen is soft.      Tenderness: There is no abdominal tenderness.   Skin:     General: Skin is warm and dry.   Neurological:      Mental Status: She is alert and oriented to person, place, and time.   Psychiatric:         Mood and Affect: Mood normal.            MELD 3.0: 12 at 1/3/2024  7:36 AM  MELD-Na: 11 at 1/3/2024  7:36 AM  Calculated from:  Serum Creatinine: 0.90 mg/dL (Using min of 1 mg/dL) at 1/3/2024  7:36 AM  Serum Sodium: 142 mmol/L (Using max of 137 mmol/L) at 1/3/2024  7:36 AM  Total Bilirubin: 1.4 mg/dL at 1/3/2024  7:36 AM  Serum Albumin: 3.8 g/dL (Using max of 3.5 g/dL) at 1/3/2024  7:36 AM  INR(ratio): 1.4 at 1/3/2024  7:36 AM  Age at  listing (hypothetical): 63 years  Sex: Female at 1/3/2024  7:36 AM      Lab Results   Component Value Date     (H) 01/18/2024    BUN 17 01/18/2024    CREATININE 0.90 01/18/2024    CALCIUM 8.6 01/18/2024     01/18/2024    K 4.7 01/18/2024    CL 99 01/18/2024    PROT 7.2 01/18/2024    CO2 36 (H) 01/18/2024    ANIONGAP 3 (L) 01/18/2024    WBC 3.60 (L) 01/18/2024    RBC 3.58 (L) 01/18/2024    HGB 11.0 (L) 01/18/2024    HCT 33.1 (L) 01/18/2024    MCV 92 01/18/2024    MCH 30.6 01/18/2024    MCHC 33.2 01/18/2024     Lab Results   Component Value Date    RDW 14.9 (H) 01/18/2024    PLT 51 (L) 01/18/2024    MPV 7.5 01/18/2024    GRAN 2.7 01/18/2024    GRAN 74.3 (H) 01/18/2024    LYMPH 0.6 (L) 01/18/2024    LYMPH 16.3 (L) 01/18/2024    MONO 0.2 (L) 01/18/2024    MONO 6.1 01/18/2024    EOSINOPHIL 2.9 01/18/2024    BASOPHIL 0.4 01/18/2024    EOS 0.1 01/18/2024    BASO 0.00 01/18/2024    APTT 30.5 02/18/2021    GROUPTRH A POS 02/27/2014    GERARD POSITIVE (A) 01/18/2024    CHOL 137 01/03/2024    TRIG 83 01/03/2024    HDL 46 01/03/2024    CHOLHDL 33.6 01/03/2024    TOTALCHOLEST 3.0 01/03/2024    ALBUMIN 3.7 01/18/2024    BILIDIR 0.3 05/23/2023    AST 40 (H) 01/18/2024    ALT 21 01/18/2024    ALKPHOS 80 01/18/2024    MG 2.4 05/18/2022    INR 1.4 (H) 01/03/2024       Assessment and Plan:  Angela Avalos is a 63 y.o. female with mild decompensation of NAFLD-induced Cirrhosis. Her meld remains <15 and her fluid is stable on low doses of diuretics. She does not need a tips or a liver tx evaluation yet. However, I remain concerned about her BMI. The surgeons will be hesitant about transplanting her with a BMI close to 50. I have asked that she lose weight to reach at least a BMI of <40. She di not restart ozempic.   Current recommendations:   1. Cirrhosis, decompensated with edema, esophageal varices and MELD <15. Remains medically early for liver tx. Will check meld labs every 3 months and will refer for liver transplant when  MELD >15. Screen for HCC every 6 months with imaging and AFP. Abdo US due 06/24; cirrhosis likely due to NAFLD. Recommend consider liver biopsy to r/o autoimmune hepatitis if liver enzymes increase. Recommend vaccination for hepatitis B.  2. Portal htn and thrombocytopenia. Large varices banded 06/23 but not 7/23- repeat 7/24  3. HE, none: monitor; remain off bzd  4. Edema/hx pleural effusion, stable - likely hepatic hydrothorax, continue current diuretics; no need for TIPS for now;   5. Platelet threshold is 60 for use of blood thinners; with hx falls and hx varices due for repeat EGD, I continue to recommend that she remain off eliquis  6. Elevated BMI- target BMI <40.  7. Hx falls: ammonia levels never high; not taking lactulose     Return 4 months  A total of 60 minutes was spent reviewing the patient's chart, examining the patient, reviewing labs and imaging and coordinating care with the patient's care team.

## 2024-03-12 ENCOUNTER — OFFICE VISIT (OUTPATIENT)
Dept: HEPATOLOGY | Facility: CLINIC | Age: 64
End: 2024-03-12
Payer: MEDICARE

## 2024-03-12 VITALS
HEIGHT: 61 IN | DIASTOLIC BLOOD PRESSURE: 76 MMHG | OXYGEN SATURATION: 99 % | TEMPERATURE: 98 F | SYSTOLIC BLOOD PRESSURE: 108 MMHG | WEIGHT: 251.13 LBS | HEART RATE: 62 BPM | BODY MASS INDEX: 47.41 KG/M2

## 2024-03-12 DIAGNOSIS — I85.10 SECONDARY ESOPHAGEAL VARICES WITHOUT BLEEDING: Primary | ICD-10-CM

## 2024-03-12 DIAGNOSIS — K74.69 OTHER CIRRHOSIS OF LIVER: ICD-10-CM

## 2024-03-12 DIAGNOSIS — K21.9 GASTROESOPHAGEAL REFLUX DISEASE, UNSPECIFIED WHETHER ESOPHAGITIS PRESENT: ICD-10-CM

## 2024-03-12 DIAGNOSIS — R16.1 SPLENOMEGALY: ICD-10-CM

## 2024-03-12 PROCEDURE — 99215 OFFICE O/P EST HI 40 MIN: CPT | Mod: S$GLB,,, | Performed by: INTERNAL MEDICINE

## 2024-03-12 PROCEDURE — 99999 PR PBB SHADOW E&M-EST. PATIENT-LVL V: CPT | Mod: PBBFAC,,, | Performed by: INTERNAL MEDICINE

## 2024-03-12 RX ORDER — PANTOPRAZOLE SODIUM 40 MG/1
40 TABLET, DELAYED RELEASE ORAL 2 TIMES DAILY
Qty: 60 TABLET | Refills: 2 | Status: SHIPPED | OUTPATIENT
Start: 2024-03-12

## 2024-03-12 NOTE — PROGRESS NOTES
HEPATOLOGY FOLLOW UP    Referring Physician: Juliann Ma MD  Current Corresponding Physician: Juliann Ma MD, Kenney Montana MD, Delgado Leong MD, Zach Ramírez MD    Anegla Avalos is here for follow up of Cirrhosis    HPI  Angela Avalos is a 64 y.o. female with pmhx of DMII, HTN, arthritis, thrombocytopenia (seeing hematologist for w/u), hx sleeve gastrectomy and fatty liver who was referred for evaluation of cirrhosis. I saw her in consultation 12/28/21.     Since 2018 her platelet count has been between 's associated with mild normocytic normochromic anemia hgb 10-11's. In 01/2021 blood work was remarkable for positive GERARD. Imaging shows cirrhosis, presumed to be from her known fatty liver. About 2 months ago she developed edema and was placed on low dose diuretics (lasix 20 mg daily and spironolactone 50 mg daily). She lost 22 lbs with this and her edema imporeved. She denied HE symptoms.      Labs 12/1/21: plts 56, INR 1.2  12/13/21: creat 0.8, Na 140, ALT 18, aST 40, ALKP 105,  Hemoglobin A1C 4.9, HCV Ab-, HBsAg-, HAV total Ab+, GERARD+ (1:320), ASMA-, iron sat 28%  6/21 AFP 2.7  BMI 39.56 (has lost about 40 lbs)  Alcohol: no significant alcohol     MRI w wo conrast 3/29/21: The liver demonstrates a shrunken nodular contour consistent with cirrhosis.  No focal liver mass is seen.  No abnormal liver enhancement noted.  Previous cholecystectomy.  Mild splenomegaly.  No focal splenic lesion.  No abnormality seen within the pancreas or kidneys; MRCP images demonstrate moderate dilatation of the common duct measuring up to 1.4 cm proximally and tapers to a normal caliber distally.  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 Vision did improve a bit. Not placed on blood thinner initially but after I saw her she was placed on eliquis but taken off when her plts fell to below 60. She was placed back on eliquis when her plts increased >60. However, her plts are now <60 again and she is off eliquis. She tells me due to the fall in plts she had a bone marrow biopsy. This was normal.    --Now walking 2 miles a day- has lost 9 lbs  --cataract surgery 7/2023.     Serologic w/u: GERARD+ (1;320), ASMA-, AMA-, TTG IgA-, HCV Ab-, HBsAg-, HBcAb-, HBsAb-, HAV IgG+  Labs 3/11/24: Tbil 1.3, ALT 28, AST 48, ALKP 83, plts 41  12/7/23: AFP 2.5    Edema/hepatic hydrothorax, ongoing: lasix 20 mg and spironolactone 50 mg;   Abdo Us 12/7/23 - no liver cancer    EGD 7/13/23: small varices-no banding; repeat due 7/24  EGD 6/15/23: large varices-banded; due for repeat in 4 weeks; coreg started    MELD 3.0: 12 at 1/3/2024  7:36 AM  MELD-Na: 11 at 1/3/2024  7:36 AM  Calculated from:  Serum Creatinine: 0.90 mg/dL (Using min of 1 mg/dL) at 1/3/2024  7:36 AM  Serum Sodium: 142 mmol/L (Using max of 137 mmol/L) at 1/3/2024  7:36 AM  Total Bilirubin: 1.4 mg/dL at 1/3/2024  7:36 AM  Serum Albumin: 3.8 g/dL (Using max of 3.5 g/dL) at 1/3/2024  7:36 AM  INR(ratio): 1.4 at 1/3/2024  7:36 AM  Age at listing (hypothetical): 63 years  Sex: Female at 1/3/2024  7:36 AM      Outpatient Encounter Medications as of 3/12/2024   Medication Sig Dispense Refill    atorvastatin (LIPITOR) 10 MG tablet Take 1 tablet (10 mg total) by mouth every evening. 90 tablet 3    benazepriL (LOTENSIN) 10 MG tablet Take 1 tablet (10 mg total) by mouth once daily. 90 tablet 1    blood  "sugar diagnostic (ONETOUCH VERIO TEST STRIPS) Strp USE STRIP TO CHECK BLOOD SUGAR TWICE DAILY. 100 strip 0    blood-glucose meter Misc 1 kit by Misc.(Non-Drug; Combo Route) route once. for 1 dose 1 each 0    carvediloL (COREG) 3.125 MG tablet Take 1 tablet (3.125 mg total) by mouth 2 (two) times daily with meals. 60 tablet 5    clotrimazole-betamethasone 1-0.05% (LOTRISONE) cream Apply topically.      diclofenac sodium (VOLTAREN) 1 % Gel Apply to affected joints 2-3 times daily 100 g 3    ergocalciferol (ERGOCALCIFEROL) 50,000 unit Cap Take 1 capsule (50,000 Units total) by mouth every 7 days. 12 capsule 3    ferrous sulfate 325 (65 FE) MG EC tablet Take 1 tablet (325 mg total) by mouth every other day. With orange juice or vitamin c 90 tablet 2    furosemide (LASIX) 40 MG tablet Take 1 tablet (40 mg total) by mouth once daily. Takes 1/2 daily 20 mg 30 tablet 5    hydrocortisone 2.5 % cream       lancets (ONETOUCH DELICA PLUS LANCET) 33 gauge Misc USE 1 LANCET TO TEST BLOOD SUGAR TWICE DAILY. 100 each 0    metoclopramide HCl (REGLAN) 10 MG tablet TAKE ONE TABLET BY MOUTH ONCE DAILY 30 tablet 2    mupirocin (BACTROBAN) 2 % ointment       nystatin (NYSTOP) powder Apply topically 2 (two) times daily. 60 g 2    oxyCODONE (ROXICODONE) 15 MG Tab Take 1 tablet (15 mg total) by mouth every 12 (twelve) hours as needed for Pain. 60 tablet 0    pantoprazole (PROTONIX) 40 MG tablet TAKE ONE TABLET BY MOUTH ONCE DAILY 30 tablet 2    pen needle, diabetic (BD ULTRA-FINE SHORT PEN NEEDLE) 31 gauge x 5/16" Ndle USE ONCE A DAY WITH VICTOZA 100 each 5    PREVIDENT 5000 SENSITIVE 1.1-5 % Pste USE A PEA SIZE AMOUNT IN THE MORNING AND EVENING      semaglutide (OZEMPIC) 0.25 mg or 0.5 mg(2 mg/1.5 mL) pen injector Inject 0.25 mg into the skin every 7 days. 0.75 mL 11    spironolactone (ALDACTONE) 100 MG tablet TAKE ONE TABLET BY MOUTH ONCE DAILY (Patient taking differently: Take 50 mg by mouth. Only takes 1/2 daily) 30 tablet 2    " sucralfate (CARAFATE) 100 mg/mL suspension Take 10 mLs (1 g total) by mouth 4 (four) times daily. 600 mL 0    triamcinolone acetonide 0.1% (KENALOG) 0.1 % cream Apply topically 2 (two) times daily. 45 g 1    [DISCONTINUED] oxyCODONE (ROXICODONE) 15 MG Tab Take 1 tablet (15 mg total) by mouth every 12 (twelve) hours as needed for Pain. 60 tablet 0     Facility-Administered Encounter Medications as of 3/12/2024   Medication Dose Route Frequency Provider Last Rate Last Admin    0.9%  NaCl infusion   Intravenous Continuous Delgado Leong MD 0 mL/hr at 03/30/22 0942 New Bag at 06/15/23 0913    0.9%  NaCl infusion   Intravenous Continuous Delgado Leong MD   Stopped at 05/18/23 0910    0.9%  NaCl infusion   Intravenous Continuous Delgado Leong MD        0.9%  NaCl infusion   Intravenous Continuous Delgado Leong MD 20 mL/hr at 07/13/23 0645 New Bag at 07/13/23 0645     Review of patient's allergies indicates:  No Known Allergies  Past Medical History:   Diagnosis Date    Anxiety     BMI 50.0-59.9, adult     Chronic tonsillitis     Clotting disorder     COVID-19 vaccine administered     2/25/21, 3/18/21, 12/12/21    Deep vein thrombosis 2004    right leg    Depression     Diabetes mellitus 2012    insulin    DVT (deep venous thrombosis) 2004    Right leg    Edema 05/10/2022    Esophageal varices     GERD (gastroesophageal reflux disease)     Hiatal hernia     History of sleeve gastrectomy     Hyperlipidemia     Hypertension     Internal hemorrhoids     Lower back pain     Morbid obesity     Osteoarthritis     Personal history of colonic polyps     non-neoplastic    Personal history of fall 03/28/2023    Pleural effusion 05/10/2022    Presumed hepatic hydrothorax    Portal hypertensive gastropathy     Primary osteoarthritis involving multiple joints 03/13/2018    Stroke     Tonsil and adenoid disease, chronic     Unspecified cirrhosis of liver        Review of Systems   Constitutional: Negative.     HENT: Negative.     Eyes: Negative.    Respiratory: Negative.     Cardiovascular: Negative.    Gastrointestinal: Negative.    Genitourinary: Negative.    Musculoskeletal: Negative.    Skin: Negative.    Neurological: Negative.    Psychiatric/Behavioral: Negative.         BMI 46  Vitals:    03/12/24 0935   BP: 108/76   Pulse: 62   Temp: 97.9 °F (36.6 °C)       Physical Exam  Constitutional:       Appearance: Normal appearance.   Eyes:      General: No scleral icterus.  Cardiovascular:      Rate and Rhythm: Normal rate.      Pulses: Normal pulses.   Pulmonary:      Effort: Pulmonary effort is normal.   Abdominal:      General: Abdomen is flat. There is no distension.      Palpations: Abdomen is soft.      Tenderness: There is no abdominal tenderness.   Skin:     General: Skin is warm and dry.   Neurological:      Mental Status: She is alert and oriented to person, place, and time.   Psychiatric:         Mood and Affect: Mood normal.            MELD 3.0: 12 at 1/3/2024  7:36 AM  MELD-Na: 11 at 1/3/2024  7:36 AM  Calculated from:  Serum Creatinine: 0.90 mg/dL (Using min of 1 mg/dL) at 1/3/2024  7:36 AM  Serum Sodium: 142 mmol/L (Using max of 137 mmol/L) at 1/3/2024  7:36 AM  Total Bilirubin: 1.4 mg/dL at 1/3/2024  7:36 AM  Serum Albumin: 3.8 g/dL (Using max of 3.5 g/dL) at 1/3/2024  7:36 AM  INR(ratio): 1.4 at 1/3/2024  7:36 AM  Age at listing (hypothetical): 63 years  Sex: Female at 1/3/2024  7:36 AM      Lab Results   Component Value Date     (H) 03/11/2024    BUN 22 (H) 03/11/2024    CREATININE 0.90 03/11/2024    CALCIUM 9.1 03/11/2024     03/11/2024    K 4.0 03/11/2024     03/11/2024    PROT 7.9 03/11/2024    CO2 30 (H) 03/11/2024    ANIONGAP 7 (L) 03/11/2024    WBC 3.60 (L) 03/11/2024    RBC 3.85 (L) 03/11/2024    HGB 11.9 (L) 03/11/2024    HCT 35.2 (L) 03/11/2024    MCV 92 03/11/2024    MCH 30.9 03/11/2024    MCHC 33.7 03/11/2024     Lab Results   Component Value Date    RDW 15.5 (H)  03/11/2024    PLT 41 (LL) 03/11/2024    MPV 7.1 (L) 03/11/2024    GRAN 2.5 03/11/2024    GRAN 69.6 03/11/2024    LYMPH 0.7 (L) 03/11/2024    LYMPH 19.4 03/11/2024    MONO 0.3 03/11/2024    MONO 7.9 03/11/2024    EOSINOPHIL 2.6 03/11/2024    BASOPHIL 0.5 03/11/2024    EOS 0.1 03/11/2024    BASO 0.00 03/11/2024    APTT 30.5 02/18/2021    GROUPTRH A POS 02/27/2014    GERARD POSITIVE (A) 01/18/2024    CHOL 137 01/03/2024    TRIG 83 01/03/2024    HDL 46 01/03/2024    CHOLHDL 33.6 01/03/2024    TOTALCHOLEST 3.0 01/03/2024    ALBUMIN 4.2 03/11/2024    BILIDIR 0.3 05/23/2023    AST 48 (H) 03/11/2024    ALT 28 03/11/2024    ALKPHOS 83 03/11/2024    MG 2.4 05/18/2022    INR 1.4 (H) 01/03/2024       Assessment and Plan:  Angela Avalos is a 64 y.o. female with mild decompensation of NAFLD-induced Cirrhosis. Her meld remains <15 and her fluid is stable on low doses of diuretics. She has varices but has not had any variceal bleeding. She has no obvious HE. She does not need a tips or a liver tx evaluation yet. However, I remain concerned about her BMI. The surgeons will be hesitant about transplanting her with a BMI close to 50. I am excited she has lost 9 lbs and have encouraged her to continue her weight loss efforts.   Current recommendations:   1. Cirrhosis, decompensated with edema, esophageal varices and MELD <15. Remains medically early for liver tx. Will check meld labs every 3 months and will refer for liver transplant when MELD >15. Screen for HCC every 6 months with imaging and AFP. Abdo US due 06/24; cirrhosis likely due to NAFLD. Recommend consider liver biopsy to r/o autoimmune hepatitis if liver enzymes increase since her GEARRD is very positive. Recommend vaccination for hepatitis B.  2. Portal htn and thrombocytopenia. Large varices banded 06/23 but not 7/23- repeat 7/24  3. HE, none: monitor; remain off bzd  4. Edema/hx pleural effusion, stable - likely hepatic hydrothorax, continue current diuretics; no need for TIPS  for now;   5. Platelet threshold is 60 for use of blood thinners; with hx falls and hx varices due for repeat EGD, I continue to recommend that she remain off eliquis  6. Elevated BMI- target BMI <40.  7. Hx falls: ammonia levels never high; not taking lactulose     Return 6 months  A total of 60 minutes was spent reviewing the patient's chart, examining the patient, reviewing labs and imaging and coordinating care with the patient's care team.

## 2024-03-12 NOTE — PATIENT INSTRUCTIONS
Continue diuretics  Labs every 3 months- next due 06/24  Abdo  in 6/24 as scheduled; will make sure you also do AFP  EGD with Dr cordon 7/24  Return 6 months

## 2024-06-12 ENCOUNTER — TELEPHONE (OUTPATIENT)
Dept: HEPATOLOGY | Facility: CLINIC | Age: 64
End: 2024-06-12
Payer: MEDICARE

## 2024-07-15 DIAGNOSIS — K21.9 GASTROESOPHAGEAL REFLUX DISEASE, UNSPECIFIED WHETHER ESOPHAGITIS PRESENT: ICD-10-CM

## 2024-07-16 RX ORDER — PANTOPRAZOLE SODIUM 40 MG/1
40 TABLET, DELAYED RELEASE ORAL 2 TIMES DAILY
Qty: 60 TABLET | Refills: 2 | Status: SHIPPED | OUTPATIENT
Start: 2024-07-16

## 2024-10-14 DIAGNOSIS — K21.9 GASTROESOPHAGEAL REFLUX DISEASE, UNSPECIFIED WHETHER ESOPHAGITIS PRESENT: ICD-10-CM

## 2024-10-14 NOTE — TELEPHONE ENCOUNTER
Refill request sent to Kenney Montana MD for approval of the following medications:   Requested Prescriptions     Pending Prescriptions Disp Refills    pantoprazole (PROTONIX) 40 MG tablet [Pharmacy Med Name: pantoprazole 40 mg tablet,delayed release] 60 tablet 2     Sig: TAKE ONE TABLET BY MOUTH 2 TIMES A DAY    metoclopramide HCl (REGLAN) 10 MG tablet [Pharmacy Med Name: metoclopramide 10 mg tablet] 30 tablet 2     Sig: TAKE ONE TABLET BY MOUTH ONCE DAILY        Last visit:09/23/2024  Next visit: 12/20/2024        [facility], [facility]. [facility] [address] [facility] [address]  Phone: [phone] Fax: [phone]

## 2024-10-15 RX ORDER — METOCLOPRAMIDE 10 MG/1
10 TABLET ORAL
Qty: 30 TABLET | Refills: 2 | Status: SHIPPED | OUTPATIENT
Start: 2024-10-15

## 2024-10-15 RX ORDER — PANTOPRAZOLE SODIUM 40 MG/1
40 TABLET, DELAYED RELEASE ORAL 2 TIMES DAILY
Qty: 60 TABLET | Refills: 2 | Status: SHIPPED | OUTPATIENT
Start: 2024-10-15

## 2025-01-13 DIAGNOSIS — K21.9 GASTROESOPHAGEAL REFLUX DISEASE, UNSPECIFIED WHETHER ESOPHAGITIS PRESENT: ICD-10-CM

## 2025-01-13 RX ORDER — METOCLOPRAMIDE 10 MG/1
10 TABLET ORAL
Qty: 30 TABLET | Refills: 2 | Status: SHIPPED | OUTPATIENT
Start: 2025-01-13

## 2025-01-13 RX ORDER — PANTOPRAZOLE SODIUM 40 MG/1
40 TABLET, DELAYED RELEASE ORAL 2 TIMES DAILY
Qty: 60 TABLET | Refills: 2 | Status: SHIPPED | OUTPATIENT
Start: 2025-01-13

## 2025-01-13 NOTE — TELEPHONE ENCOUNTER
Refill request sent to Kenney Montana MD for approval of the following medications:   Requested Prescriptions     Pending Prescriptions Disp Refills    pantoprazole (PROTONIX) 40 MG tablet [Pharmacy Med Name: pantoprazole 40 mg tablet,delayed release] 60 tablet 2     Sig: TAKE ONE TABLET BY MOUTH 2 TIMES A DAY    metoclopramide HCl (REGLAN) 10 MG tablet [Pharmacy Med Name: metoclopramide 10 mg tablet] 30 tablet 2     Sig: TAKE ONE TABLET BY MOUTH ONCE DAILY        Last visit: 12/20/2024  Next visit: 03/31/2025        [facility] [employer], [facility]. [facility] [address] . [facility] [address]  Phone: [phone] Fax: [phone]

## 2025-02-14 ENCOUNTER — TELEPHONE (OUTPATIENT)
Dept: HEPATOLOGY | Facility: CLINIC | Age: 65
End: 2025-02-14
Payer: MEDICARE

## 2025-02-14 NOTE — TELEPHONE ENCOUNTER
Returned call to patient. Patient stated wanted to schedule a follow up visit with  Dr Desir at [place] but told patient Dr Desir is at [facility] [place] only. Patient agreed to come to [facility] for a check up visit. Patient agreed to do visit on 03/10/25.

## 2025-02-14 NOTE — TELEPHONE ENCOUNTER
----- Message from Evelyn sent at 2/14/2025 12:26 PM CST -----  Appointment Request    Name of Caller:Angela  Symptoms or reason for appointment:checkup  Best Call Back Number:[phone]  Pt unsure what location she is at now, unable to schedule at former ([facility])

## 2025-03-04 PROBLEM — R45.89 NEED FOR EMOTIONAL SUPPORT: Status: ACTIVE | Noted: 2025-03-04

## 2025-03-10 ENCOUNTER — OFFICE VISIT (OUTPATIENT)
Dept: HEPATOLOGY | Facility: CLINIC | Age: 65
End: 2025-03-10
Payer: MEDICARE

## 2025-03-10 VITALS
TEMPERATURE: 98 F | DIASTOLIC BLOOD PRESSURE: 67 MMHG | OXYGEN SATURATION: 99 % | WEIGHT: 223.56 LBS | HEIGHT: 61 IN | BODY MASS INDEX: 42.21 KG/M2 | SYSTOLIC BLOOD PRESSURE: 148 MMHG | HEART RATE: 69 BPM

## 2025-03-10 DIAGNOSIS — K74.69 OTHER CIRRHOSIS OF LIVER: ICD-10-CM

## 2025-03-10 DIAGNOSIS — I85.10 SECONDARY ESOPHAGEAL VARICES WITHOUT BLEEDING: Primary | ICD-10-CM

## 2025-03-10 PROCEDURE — 99215 OFFICE O/P EST HI 40 MIN: CPT | Mod: S$GLB,,, | Performed by: INTERNAL MEDICINE

## 2025-03-10 PROCEDURE — 3288F FALL RISK ASSESSMENT DOCD: CPT | Mod: CPTII,S$GLB,, | Performed by: INTERNAL MEDICINE

## 2025-03-10 PROCEDURE — 1159F MED LIST DOCD IN RCRD: CPT | Mod: CPTII,S$GLB,, | Performed by: INTERNAL MEDICINE

## 2025-03-10 PROCEDURE — 1101F PT FALLS ASSESS-DOCD LE1/YR: CPT | Mod: CPTII,S$GLB,, | Performed by: INTERNAL MEDICINE

## 2025-03-10 PROCEDURE — 3008F BODY MASS INDEX DOCD: CPT | Mod: CPTII,S$GLB,, | Performed by: INTERNAL MEDICINE

## 2025-03-10 PROCEDURE — 3078F DIAST BP <80 MM HG: CPT | Mod: CPTII,S$GLB,, | Performed by: INTERNAL MEDICINE

## 2025-03-10 PROCEDURE — 1125F AMNT PAIN NOTED PAIN PRSNT: CPT | Mod: CPTII,S$GLB,, | Performed by: INTERNAL MEDICINE

## 2025-03-10 PROCEDURE — 99999 PR PBB SHADOW E&M-EST. PATIENT-LVL V: CPT | Mod: PBBFAC,,, | Performed by: INTERNAL MEDICINE

## 2025-03-10 PROCEDURE — 3077F SYST BP >= 140 MM HG: CPT | Mod: CPTII,S$GLB,, | Performed by: INTERNAL MEDICINE

## 2025-03-10 NOTE — PATIENT INSTRUCTIONS
Continue current water pills  Labs every 3 months (due next 5/25) and abdo US every 6 months w AFP (due next 8/25)  EGD 9/25  Return 4 months

## 2025-03-10 NOTE — PROGRESS NOTES
HEPATOLOGY FOLLOW UP    Referring Physician: uJliann Ma MD  Current Corresponding Physician: Juliann Ma MD, Kenney Montana MD, Delgado Leong MD, Zach Ramírez MD    Angela Avalos is here for follow up of cirrhosis    HPI  Angela Avalos is a 65 y.o. female with pmhx of DMII, HTN, arthritis, thrombocytopenia (seeing hematologist for w/u), hx sleeve gastrectomy and fatty liver who was referred for evaluation of cirrhosis. I saw her in consultation 12/28/21.     Since 2018 her platelet count has been between 's associated with mild normocytic normochromic anemia hgb 10-11's. In 01/2021 blood work was remarkable for positive GERARD. Imaging shows cirrhosis, presumed to be from her known fatty liver. About 2 months ago she developed edema and was placed on low dose diuretics (lasix 20 mg daily and spironolactone 50 mg daily). She lost 22 lbs with this and her edema imporeved. She denied HE symptoms.      Labs 12/1/21: plts 56, INR 1.2  12/13/21: creat 0.8, Na 140, ALT 18, aST 40, ALKP 105,  Hemoglobin A1C 4.9, HCV Ab-, HBsAg-, HAV total Ab+, GERARD+ (1:320), ASMA-, iron sat 28%  6/21 AFP 2.7  BMI 39.56 (has lost about 40 lbs)  Alcohol: no significant alcohol     MRI w wo conrast 3/29/21: The liver demonstrates a shrunken nodular contour consistent with cirrhosis.  No focal liver mass is seen.  No abnormal liver enhancement noted.  Previous cholecystectomy.  Mild splenomegaly.  No focal splenic lesion.  No abnormality seen within the pancreas or kidneys; MRCP images demonstrate moderate dilatation of the common duct measuring up to 1.4 cm proximally and tapers to a normal caliber distally.  No filling defect is seen, and no source of obstruction identified (of note ALKP is normal at 105)     EGD 2013: no varices     MELD-Na score: 9 at 6/28/2021  7:28 AM  MELD score: 9 at 6/28/2021  7:28 AM  Calculated from:  Serum Creatinine: 0.75 mg/dL (Using min of 1 mg/dL) at 6/28/2021  7:28 AM  Serum Sodium: 140  mmol/L (Using max of 137 mmol/L) at 6/28/2021  7:28 AM  Total Bilirubin: 1.1 mg/dL at 6/28/2021  7:28 AM  INR(ratio): 1.2 at 6/28/2021  7:28 AM    Interval History  Impression at time of consult: decompensated cirrhosis, low MELD.Since Angela Avalos's last few visits:  --developed worsening fluid retention- diuretics doubled and fluid improved  Edema/hepatic hydrothorax, ongoing: lasix increased to 40 mg and spironolactone to 100 mg;   CT abdo pelvis w IV contrast:  - no liver cancer; In the region of the umbilicus is a fluid collection 6.1 x 4.6 x 7.6 cm suggesting a umbilical hernia containing fluid (diuretics increased and hernia decreased-lost 15 lbs)    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 Vision did improve a bit. Not placed on blood thinner initially but after I saw her she was placed on eliquis but taken off when her plts fell to below 60. She was placed back on eliquis when her plts increased >60. However, her plts are now <60 again and she is off eliquis. She tells me due to the fall in plts she had a bone marrow biopsy. This was normal.    --continues to walk 2 miles a day- has lost 9 lbs  --cataract surgery 7/2023.     Serologic w/u: GERARD+ (1;320), ASMA-, AMA-, TTG IgA-, HCV Ab-, HBsAg-, HBcAb-, HBsAb-, HAV IgG+  Labs 2/20/25: Tbil 1.4, ALT 25, AST 43, ALKP 82, plts 41  6/12/24: AFP 1.5      EGD 9/3/24: small varices - no banding-repeat 9/25  EGD 7/13/23: small varices-no banding; repeat due 7/24  EGD 6/15/23: large varices-banded; due for repeat in 4 weeks; coreg started    MELD 3.0: 14 at 6/12/2024  8:35 AM  MELD-Na: 13 at 6/12/2024  8:35 AM  Calculated from:  Serum Creatinine: 0.99 mg/dL (Using min of 1 mg/dL) at 6/12/2024  8:35 AM  Serum Sodium: 136 mmol/L at 6/12/2024  8:35 AM  Total Bilirubin: 1.8 mg/dL at 6/12/2024  8:35 AM  Serum Albumin: 3.9 g/dL (Using max of 3.5 g/dL) at 6/12/2024  8:35 AM  INR(ratio): 1.4 at 6/12/2024  8:35 AM  Age at listing (hypothetical): 64 years  Sex:  Female at 6/12/2024  8:35 AM      Outpatient Encounter Medications as of 3/10/2025   Medication Sig Dispense Refill    atorvastatin (LIPITOR) 10 MG tablet TAKE ONE TABLET BY MOUTH EVERY EVENING FOR CHOLESTEROL 90 tablet 2    blood sugar diagnostic (ONETOUCH VERIO TEST STRIPS) Strp USE STRIP TO CHECK BLOOD SUGAR TWICE DAILY. 100 strip 0    blood-glucose meter Misc 1 kit by Misc.(Non-Drug; Combo Route) route once. for 1 dose 1 each 0    carvediloL (COREG) 3.125 MG tablet TAKE ONE TABLET BY MOUTH 2 TIMES A DAY WITH MEALS 60 tablet 2    clotrimazole-betamethasone 1-0.05% (LOTRISONE) cream Apply topically.      diclofenac sodium (VOLTAREN) 1 % Gel Apply to affected joints 2-3 times daily 100 g 3    ergocalciferol (ERGOCALCIFEROL) 50,000 unit Cap TAKE 1 CAPSULE BY MOUTH once weekly 12 capsule 2    FEROSUL 325 mg (65 mg iron) Tab tablet TAKE ONE TABLET BY MOUTH EVERY OTHER DAY with orange juice and vitamin tablet 70 tablet 1    furosemide (LASIX) 40 MG tablet Take 1 tablet (40 mg total) by mouth once daily. Takes 1/2 daily 20 mg 30 tablet 5    ketoconazole (NIZORAL) 2 % cream Apply topically once daily. 60 g 0    lancets (ONETOUCH DELICA PLUS LANCET) 33 gauge Misc USE 1 LANCET TO TEST BLOOD SUGAR TWICE DAILY. 100 each 0    metoclopramide HCl (REGLAN) 10 MG tablet TAKE ONE TABLET BY MOUTH ONCE DAILY 30 tablet 2    mupirocin (BACTROBAN) 2 % ointment       nystatin (NYSTOP) powder Apply topically 2 (two) times daily. 60 g 2    ondansetron (ZOFRAN) 4 MG tablet Take 1 tablet (4 mg total) by mouth 2 (two) times daily as needed for Nausea. 10 tablet 0    oxyCODONE (ROXICODONE) 15 MG Tab Take 1 tablet (15 mg total) by mouth every 12 (twelve) hours as needed for Pain. 60 tablet 0    OZEMPIC 0.25 mg or 0.5 mg (2 mg/3 mL) pen injector Inject 0.25 mg into the skin once a week.      pantoprazole (PROTONIX) 40 MG tablet TAKE ONE TABLET BY MOUTH 2 TIMES A DAY 60 tablet 2    paroxetine (PAXIL) 10 MG tablet TAKE ONE TABLET BY MOUTH EVERY  "MORNING 30 tablet 2    pen needle, diabetic (BD ULTRA-FINE SHORT PEN NEEDLE) 31 gauge x 5/16" Ndle USE ONCE A DAY WITH VICTOZA 100 each 5    semaglutide (OZEMPIC) 0.25 mg or 0.5 mg(2 mg/1.5 mL) pen injector Inject 0.25 mg into the skin every 7 days. 0.75 mL 5    spironolactone (ALDACTONE) 100 MG tablet TAKE ONE TABLET BY MOUTH ONCE DAILY 30 tablet 2    sucralfate (CARAFATE) 100 mg/mL suspension Take 10 mLs (1 g total) by mouth 4 (four) times daily. 600 mL 0    triamcinolone acetonide 0.1% (KENALOG) 0.1 % cream Apply topically 2 (two) times daily. 45 g 1    [DISCONTINUED] oxyCODONE (ROXICODONE) 15 MG Tab Take 1 tablet (15 mg total) by mouth every 12 (twelve) hours as needed for Pain. 60 tablet 0    [DISCONTINUED] sucralfate (CARAFATE) 100 mg/mL suspension Take 10 mLs (1 g total) by mouth 4 (four) times daily. 600 mL 0     Facility-Administered Encounter Medications as of 3/10/2025   Medication Dose Route Frequency Provider Last Rate Last Admin    0.9%  NaCl infusion   Intravenous Continuous Delgado Leong MD 0 mL/hr at 03/30/22 0942 New Bag at 06/15/23 0913    0.9%  NaCl infusion   Intravenous Continuous Delgado Leong MD   Stopped at 05/18/23 0910    0.9%  NaCl infusion   Intravenous Continuous Delgado Leong MD        0.9%  NaCl infusion   Intravenous Continuous Delgado Leong MD 20 mL/hr at 07/13/23 0645 New Bag at 07/13/23 0645     Review of patient's allergies indicates:  No Known Allergies  Past Medical History:   Diagnosis Date    Anxiety     BMI 50.0-59.9, adult     Chronic tonsillitis     Clotting disorder     COVID-19 vaccine administered     2/25/21, 3/18/21, 12/12/21    Deep vein thrombosis 2004    right leg    Depression     Diabetes mellitus 2012    insulin    DVT (deep venous thrombosis) 2004    Right leg    Edema 05/10/2022    Esophageal varices     GERD (gastroesophageal reflux disease)     Hiatal hernia     History of sleeve gastrectomy     Hyperlipidemia     Hypertension     " Internal hemorrhoids     Lower back pain     Morbid obesity     Osteoarthritis     Personal history of colonic polyps     non-neoplastic    Personal history of fall 03/28/2023    Pleural effusion 05/10/2022    Presumed hepatic hydrothorax    Portal hypertensive gastropathy     Primary osteoarthritis involving multiple joints 03/13/2018    Stroke     Tonsil and adenoid disease, chronic     Unspecified cirrhosis of liver        Review of Systems   Constitutional: Negative.    HENT: Negative.     Eyes: Negative.    Respiratory: Negative.     Cardiovascular: Negative.    Gastrointestinal: Negative.    Genitourinary: Negative.    Musculoskeletal: Negative.    Skin: Negative.    Neurological: Negative.    Psychiatric/Behavioral: Negative.         BMI 46  Vitals:    03/10/25 0956   BP: (!) 148/67   Pulse: 69   Temp: 97.8 °F (36.6 °C)          Physical Exam  Constitutional:       Appearance: Normal appearance.   Eyes:      General: No scleral icterus.  Cardiovascular:      Rate and Rhythm: Normal rate.      Pulses: Normal pulses.   Pulmonary:      Effort: Pulmonary effort is normal.   Abdominal:      General: Abdomen is flat. There is no distension.      Palpations: Abdomen is soft.      Tenderness: There is no abdominal tenderness.   Skin:     General: Skin is warm and dry.   Neurological:      Mental Status: She is alert and oriented to person, place, and time.   Psychiatric:         Mood and Affect: Mood normal.            MELD 3.0: 14 at 6/12/2024  8:35 AM  MELD-Na: 13 at 6/12/2024  8:35 AM  Calculated from:  Serum Creatinine: 0.99 mg/dL (Using min of 1 mg/dL) at 6/12/2024  8:35 AM  Serum Sodium: 136 mmol/L at 6/12/2024  8:35 AM  Total Bilirubin: 1.8 mg/dL at 6/12/2024  8:35 AM  Serum Albumin: 3.9 g/dL (Using max of 3.5 g/dL) at 6/12/2024  8:35 AM  INR(ratio): 1.4 at 6/12/2024  8:35 AM  Age at listing (hypothetical): 64 years  Sex: Female at 6/12/2024  8:35 AM      Lab Results   Component Value Date      02/20/2025    BUN 22 (H) 02/20/2025    CREATININE 0.91 02/20/2025    CALCIUM 8.9 02/20/2025     02/20/2025    K 4.6 02/20/2025     02/20/2025    PROT 7.7 02/20/2025    CO2 32 (H) 02/20/2025    ANIONGAP 6 (L) 02/20/2025    WBC 3.40 (L) 02/20/2025    RBC 3.87 (L) 02/20/2025    HGB 11.6 (L) 02/20/2025    HCT 34.4 (L) 02/20/2025    MCV 89 02/20/2025    MCH 30.0 02/20/2025    MCHC 33.7 02/20/2025     Lab Results   Component Value Date    RDW 15.8 (H) 02/20/2025    PLT 41 (LL) 02/20/2025    MPV 7.2 (L) 02/20/2025    GRAN 2.6 02/20/2025    GRAN 74.8 (H) 02/20/2025    LYMPH 0.5 (L) 02/20/2025    LYMPH 15.9 (L) 02/20/2025    MONO 0.2 (L) 02/20/2025    MONO 7.2 02/20/2025    EOSINOPHIL 1.8 02/20/2025    BASOPHIL 0.3 02/20/2025    EOS 0.1 02/20/2025    BASO 0.00 02/20/2025    APTT 30.5 02/18/2021    GROUPTRH A POS 02/27/2014    GERARD POSITIVE (A) 08/21/2024    CHOL 147 12/02/2024    TRIG 80 12/02/2024    HDL 39 (L) 12/02/2024    CHOLHDL 26.5 12/02/2024    TOTALCHOLEST 3.8 12/02/2024    ALBUMIN 3.8 02/20/2025    BILIDIR 0.2 06/12/2024    AST 43 (H) 02/20/2025    ALT 25 02/20/2025    ALKPHOS 82 02/20/2025    MG 2.0 04/15/2024    INR 1.4 (H) 06/12/2024       Assessment and Plan:  Angela Avalos is a 65 y.o. female with mild decompensation of NAFLD-induced Cirrhosis. Her meld remains <15 and her fluid is stable on low doses of diuretics, although recently increased. She has varices but has not had any variceal bleeding. She has no obvious HE. She does not need a tips or a liver tx evaluation yet. However, I remain concerned about her BMI. The surgeons will be hesitant about transplanting her with a BMI close to 50. I am excited she has lost 9 lbs and have encouraged her to continue her weight loss efforts.   Current recommendations:   1. Cirrhosis, decompensated with edema, esophageal varices and MELD <15. Remains medically early for liver tx. Will check meld labs every 3 months and will refer for liver transplant when  MELD >15. Screen for HCC every 6 months with imaging and AFP. Abdo US due 06/24; cirrhosis likely due to NAFLD. Recommend consider liver biopsy to r/o autoimmune hepatitis if liver enzymes increase since her GERARD is very positive. Recommend vaccination for hepatitis B.  2. Portal htn and thrombocytopenia. Small varices 9/24 (previously banded 06/23)-repeat 9/25  3. HE, none: monitor; remain off bzd  4. Edema/hx pleural effusion, stable; continue current diuretics; no need for TIPS for now;   5. Platelet threshold is 60 for use of blood thinners; with hx falls and hx varices due for repeat EGD, I continue to recommend that she remain off eliquis  6. Elevated BMI- target BMI <40.  7. Hx falls: ammonia levels never high; not taking lactulose     Return 4 months  A total of 60 minutes was spent reviewing the patient's chart, examining the patient, reviewing labs and imaging and coordinating care with the patient's care team.

## 2025-03-13 DIAGNOSIS — K74.60 CIRRHOSIS OF LIVER WITH ASCITES, UNSPECIFIED HEPATIC CIRRHOSIS TYPE: ICD-10-CM

## 2025-03-13 DIAGNOSIS — R18.8 CIRRHOSIS OF LIVER WITH ASCITES, UNSPECIFIED HEPATIC CIRRHOSIS TYPE: ICD-10-CM

## 2025-03-13 RX ORDER — METOCLOPRAMIDE 10 MG/1
10 TABLET ORAL
Qty: 30 TABLET | Refills: 2 | Status: SHIPPED | OUTPATIENT
Start: 2025-03-13

## 2025-03-13 RX ORDER — FUROSEMIDE 40 MG/1
40 TABLET ORAL DAILY
Qty: 30 TABLET | Refills: 5 | Status: SHIPPED | OUTPATIENT
Start: 2025-03-13 | End: 2025-09-09

## 2025-03-13 NOTE — TELEPHONE ENCOUNTER
Refill request sent to Kenney Montana MD for approval of the following medications:   Requested Prescriptions     Pending Prescriptions Disp Refills    metoclopramide HCl (REGLAN) 10 MG tablet [Pharmacy Med Name: metoclopramide 10 mg tablet] 30 tablet 2     Sig: TAKE ONE TABLET BY MOUTH ONCE DAILY    furosemide (LASIX) 40 MG tablet [Pharmacy Med Name: furosemide 40 mg tablet] 30 tablet 5     Sig: Take 1 tablet (40 mg total) by mouth once daily. Takes 1/2 daily 20 mg        Last visit: Visit date not found   Next visit:3/31

## 2025-05-12 ENCOUNTER — RESULTS FOLLOW-UP (OUTPATIENT)
Dept: TRANSPLANT | Facility: CLINIC | Age: 65
End: 2025-05-12

## 2025-07-07 ENCOUNTER — OFFICE VISIT (OUTPATIENT)
Dept: HEPATOLOGY | Facility: CLINIC | Age: 65
End: 2025-07-07
Payer: MEDICARE

## 2025-07-07 VITALS
DIASTOLIC BLOOD PRESSURE: 89 MMHG | BODY MASS INDEX: 41.88 KG/M2 | SYSTOLIC BLOOD PRESSURE: 126 MMHG | OXYGEN SATURATION: 97 % | WEIGHT: 221.81 LBS | HEIGHT: 61 IN | TEMPERATURE: 98 F | HEART RATE: 68 BPM

## 2025-07-07 DIAGNOSIS — K74.69 OTHER CIRRHOSIS OF LIVER: Primary | ICD-10-CM

## 2025-07-07 DIAGNOSIS — E66.01 CLASS 3 SEVERE OBESITY DUE TO EXCESS CALORIES WITH SERIOUS COMORBIDITY AND BODY MASS INDEX (BMI) OF 40.0 TO 44.9 IN ADULT: ICD-10-CM

## 2025-07-07 DIAGNOSIS — E66.813 CLASS 3 SEVERE OBESITY DUE TO EXCESS CALORIES WITH SERIOUS COMORBIDITY AND BODY MASS INDEX (BMI) OF 40.0 TO 44.9 IN ADULT: ICD-10-CM

## 2025-07-07 DIAGNOSIS — I85.10 SECONDARY ESOPHAGEAL VARICES WITHOUT BLEEDING: ICD-10-CM

## 2025-07-07 PROCEDURE — 1160F RVW MEDS BY RX/DR IN RCRD: CPT | Mod: CPTII,S$GLB,, | Performed by: INTERNAL MEDICINE

## 2025-07-07 PROCEDURE — 3288F FALL RISK ASSESSMENT DOCD: CPT | Mod: CPTII,S$GLB,, | Performed by: INTERNAL MEDICINE

## 2025-07-07 PROCEDURE — 3074F SYST BP LT 130 MM HG: CPT | Mod: CPTII,S$GLB,, | Performed by: INTERNAL MEDICINE

## 2025-07-07 PROCEDURE — 1101F PT FALLS ASSESS-DOCD LE1/YR: CPT | Mod: CPTII,S$GLB,, | Performed by: INTERNAL MEDICINE

## 2025-07-07 PROCEDURE — 99999 PR PBB SHADOW E&M-EST. PATIENT-LVL III: CPT | Mod: PBBFAC,,, | Performed by: INTERNAL MEDICINE

## 2025-07-07 PROCEDURE — 3008F BODY MASS INDEX DOCD: CPT | Mod: CPTII,S$GLB,, | Performed by: INTERNAL MEDICINE

## 2025-07-07 PROCEDURE — 1125F AMNT PAIN NOTED PAIN PRSNT: CPT | Mod: CPTII,S$GLB,, | Performed by: INTERNAL MEDICINE

## 2025-07-07 PROCEDURE — 1159F MED LIST DOCD IN RCRD: CPT | Mod: CPTII,S$GLB,, | Performed by: INTERNAL MEDICINE

## 2025-07-07 PROCEDURE — 3079F DIAST BP 80-89 MM HG: CPT | Mod: CPTII,S$GLB,, | Performed by: INTERNAL MEDICINE

## 2025-07-07 PROCEDURE — 99215 OFFICE O/P EST HI 40 MIN: CPT | Mod: S$GLB,,, | Performed by: INTERNAL MEDICINE

## 2025-07-07 PROCEDURE — 3044F HG A1C LEVEL LT 7.0%: CPT | Mod: CPTII,S$GLB,, | Performed by: INTERNAL MEDICINE

## 2025-07-07 NOTE — PROGRESS NOTES
HEPATOLOGY FOLLOW UP    Referring Physician: Juliann Ma MD  Current Corresponding Physician: Juliann Ma MD, Kenney Montana MD, Delgado Leong MD, Zach Ramírez MD    Angela Avalos is here for follow up of cirrhosis    HPI  Angela Avalos is a 65 y.o. female with pmhx of DMII, HTN, arthritis, thrombocytopenia (seeing hematologist for w/u), hx sleeve gastrectomy and fatty liver who was referred for evaluation of cirrhosis. I saw her in consultation 12/28/21.     Since 2018 her platelet count has been between 's associated with mild normocytic normochromic anemia hgb 10-11's. In 01/2021 blood work was remarkable for positive GERARD. Imaging shows cirrhosis, presumed to be from her known fatty liver. About 2 months ago she developed edema and was placed on low dose diuretics (lasix 20 mg daily and spironolactone 50 mg daily). She lost 22 lbs with this and her edema imporeved. She denied HE symptoms.      Labs 12/1/21: plts 56, INR 1.2  12/13/21: creat 0.8, Na 140, ALT 18, aST 40, ALKP 105,  Hemoglobin A1C 4.9, HCV Ab-, HBsAg-, HAV total Ab+, GERARD+ (1:320), ASMA-, iron sat 28%  6/21 AFP 2.7  BMI 39.56 (has lost about 40 lbs)  Alcohol: no significant alcohol     MRI w wo conrast 3/29/21: The liver demonstrates a shrunken nodular contour consistent with cirrhosis.  No focal liver mass is seen.  No abnormal liver enhancement noted.  Previous cholecystectomy.  Mild splenomegaly.  No focal splenic lesion.  No abnormality seen within the pancreas or kidneys; MRCP images demonstrate moderate dilatation of the common duct measuring up to 1.4 cm proximally and tapers to a normal caliber distally.  No filling defect is seen, and no source of obstruction identified (of note ALKP is normal at 105)     EGD 2013: no varices     MELD-Na score: 9 at 6/28/2021  7:28 AM  MELD score: 9 at 6/28/2021  7:28 AM  Calculated from:  Serum Creatinine: 0.75 mg/dL (Using min of 1 mg/dL) at 6/28/2021  7:28 AM  Serum Sodium: 140  "mmol/L (Using max of 137 mmol/L) at 6/28/2021  7:28 AM  Total Bilirubin: 1.1 mg/dL at 6/28/2021  7:28 AM  INR(ratio): 1.2 at 6/28/2021  7:28 AM    Interval History  Impression at time of consult: decompensated cirrhosis, low MELD.Since Angela Avalos's last few visits:  --developed worsening fluid retention- diuretics doubled and fluid improved  --continues to walk 2 miles a day- has lost 30 lbs over 2 years; on ozempic  Edema/hepatic hydrothorax, ongoing: lasix 40 mg and spironolactone 100 mg-taking every other day x the last 7 days-"concern" her creatinine would go up;   CT abdo pelvis w IV contrast 2/3/25:  -There is a nodular cirrhotic appearing liver. The spleen is enlarged at 16 cm. The splenic vein is patent however there is thrombus in the portal vein region of the midline of the pancreas extending towards the ritu hepatis. In there is diffuse ascites.     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 Vision did improve a bit. Not placed on blood thinner initially but after I saw her she was placed on eliquis but taken off when her plts fell to below 60. She was placed back on eliquis when her plts increased >60. However, her plts are now <60 again and she is off eliquis. She tells me due to the fall in plts she had a bone marrow biopsy. This was normal.    Serologic w/u: GERARD+ (1;320), ASMA-, AMA-, TTG IgA-, HCV Ab-, HBsAg-, HBcAb-, HBsAb-, HAV IgG+  Labs 2/20/25: Tbil 1.4, ALT 25, AST 43, ALKP 82, plts 41  6/12/24: AFP 1.5      EGD 9/3/24: small varices - no banding-repeat 9/25  EGD 7/13/23: small varices-no banding; repeat due 7/24  EGD 6/15/23: large varices-banded; due for repeat in 4 weeks; coreg started    MELD 3.0: 13 at 5/12/2025  7:30 AM  MELD-Na: 12 at 5/12/2025  7:30 AM  Calculated from:  Serum Creatinine: 0.8 mg/dL (Using min of 1 mg/dL) at 5/12/2025  7:30 AM  Serum Sodium: 139 mmol/L (Using max of 137 mmol/L) at 5/12/2025  7:30 AM  Total Bilirubin: 1.7 mg/dL at 5/12/2025  7:30 AM  Serum " "Albumin: 4 g/dL (Using max of 3.5 g/dL) at 5/12/2025  7:30 AM  INR(ratio): 1.4 at 5/12/2025  7:30 AM  Age at listing (hypothetical): 65 years  Sex: Female at 5/12/2025  7:30 AM      Outpatient Encounter Medications as of 7/7/2025   Medication Sig Dispense Refill    atorvastatin (LIPITOR) 10 MG tablet TAKE ONE TABLET BY MOUTH EVERY EVENING FOR CHOLESTEROL 90 tablet 2    blood sugar diagnostic (ONETOUCH VERIO TEST STRIPS) Strp USE STRIP TO CHECK BLOOD SUGAR TWICE DAILY. 100 strip 0    carvediloL (COREG) 3.125 MG tablet TAKE ONE TABLET BY MOUTH 2 TIMES A DAY WITH MEALS 60 tablet 2    ergocalciferol (ERGOCALCIFEROL) 50,000 unit Cap Take 1 capsule (50,000 Units total) by mouth every 7 days. 12 capsule 0    ferrous sulfate 325 (65 FE) MG EC tablet Take 1 tablet (325 mg total) by mouth every other day. With orange juice or vitamin c 90 tablet 1    furosemide (LASIX) 40 MG tablet Take 1 tablet (40 mg total) by mouth once daily. Takes 1/2 daily 20 mg 30 tablet 5    lancets (ONETOUCH DELICA PLUS LANCET) 33 gauge Misc USE 1 LANCET TO TEST BLOOD SUGAR TWICE DAILY. 100 each 0    ondansetron (ZOFRAN) 4 MG tablet Take 1 tablet (4 mg total) by mouth 2 (two) times daily as needed for Nausea. 10 tablet 0    oxyCODONE (ROXICODONE) 15 MG Tab Take 1 tablet (15 mg total) by mouth every 12 (twelve) hours as needed for Pain. 60 tablet 0    OZEMPIC 0.25 mg or 0.5 mg (2 mg/3 mL) pen injector Inject 0.25 mg into the skin every 7 days. 3 mL 5    pantoprazole (PROTONIX) 40 MG tablet TAKE ONE TABLET BY MOUTH 2 TIMES A DAY 60 tablet 2    pen needle, diabetic (BD ULTRA-FINE SHORT PEN NEEDLE) 31 gauge x 5/16" Ndle USE ONCE A DAY WITH VICTOZA 100 each 5    spironolactone (ALDACTONE) 100 MG tablet TAKE ONE TABLET BY MOUTH ONCE DAILY 30 tablet 2    sucralfate (CARAFATE) 100 mg/mL suspension Take 10 mLs (1 g total) by mouth 4 (four) times daily. 600 mL 0    blood-glucose meter Misc 1 kit by Misc.(Non-Drug; Combo Route) route once. for 1 dose (Patient " not taking: Reported on 6/30/2025) 1 each 0    metoclopramide HCl (REGLAN) 10 MG tablet TAKE ONE TABLET BY MOUTH ONCE DAILY (Patient not taking: Reported on 7/7/2025) 30 tablet 2    [DISCONTINUED] ergocalciferol (ERGOCALCIFEROL) 50,000 unit Cap TAKE 1 CAPSULE BY MOUTH once weekly 12 capsule 0    [DISCONTINUED] oxyCODONE (ROXICODONE) 15 MG Tab Take 1 tablet (15 mg total) by mouth every 12 (twelve) hours as needed for Pain. 60 tablet 0    [DISCONTINUED] oxyCODONE (ROXICODONE) 15 MG Tab Take 1 tablet (15 mg total) by mouth every 12 (twelve) hours as needed for Pain. 60 tablet 0     Facility-Administered Encounter Medications as of 7/7/2025   Medication Dose Route Frequency Provider Last Rate Last Admin    0.9%  NaCl infusion   Intravenous Continuous Delgado Leong MD 0 mL/hr at 03/30/22 0942 New Bag at 06/15/23 0913    0.9%  NaCl infusion   Intravenous Continuous Delgado Leong MD   Stopped at 05/18/23 0910    0.9%  NaCl infusion   Intravenous Continuous Delgado Leong MD        0.9%  NaCl infusion   Intravenous Continuous Delgado Leong MD 20 mL/hr at 07/13/23 0645 New Bag at 07/13/23 0645     Review of patient's allergies indicates:  No Known Allergies  Past Medical History:   Diagnosis Date    Anxiety     BMI 50.0-59.9, adult     Chronic tonsillitis     Clotting disorder     COVID-19 vaccine administered     2/25/21, 3/18/21, 12/12/21    Deep vein thrombosis 2004    right leg    Depression     Diabetes mellitus 2012    insulin    DVT (deep venous thrombosis) 2004    Right leg    Edema 05/10/2022    Esophageal varices     GERD (gastroesophageal reflux disease)     Hiatal hernia     History of sleeve gastrectomy     Hyperlipidemia     Hypertension     Internal hemorrhoids     Lower back pain     Morbid obesity     Osteoarthritis     Personal history of colonic polyps     non-neoplastic    Personal history of fall 03/28/2023    Pleural effusion 05/10/2022    Presumed hepatic hydrothorax     Portal hypertensive gastropathy     Primary osteoarthritis involving multiple joints 03/13/2018    Stroke     Tonsil and adenoid disease, chronic     Unspecified cirrhosis of liver        Review of Systems   Constitutional: Negative.    HENT: Negative.     Eyes: Negative.    Respiratory: Negative.     Cardiovascular: Negative.    Gastrointestinal: Negative.    Genitourinary: Negative.    Musculoskeletal: Negative.    Skin: Negative.    Neurological: Negative.    Psychiatric/Behavioral: Negative.         BMI 46  Vitals:    07/07/25 0812   BP: 126/89   Pulse: 68   Temp: 98.1 °F (36.7 °C)          Physical Exam  Constitutional:       Appearance: Normal appearance.   Eyes:      General: No scleral icterus.  Cardiovascular:      Rate and Rhythm: Normal rate.      Pulses: Normal pulses.   Pulmonary:      Effort: Pulmonary effort is normal.   Abdominal:      General: Abdomen is flat. There is no distension.      Palpations: Abdomen is soft.      Tenderness: There is no abdominal tenderness.   Skin:     General: Skin is warm and dry.   Neurological:      Mental Status: She is alert and oriented to person, place, and time.   Psychiatric:         Mood and Affect: Mood normal.            MELD 3.0: 13 at 5/12/2025  7:30 AM  MELD-Na: 12 at 5/12/2025  7:30 AM  Calculated from:  Serum Creatinine: 0.8 mg/dL (Using min of 1 mg/dL) at 5/12/2025  7:30 AM  Serum Sodium: 139 mmol/L (Using max of 137 mmol/L) at 5/12/2025  7:30 AM  Total Bilirubin: 1.7 mg/dL at 5/12/2025  7:30 AM  Serum Albumin: 4 g/dL (Using max of 3.5 g/dL) at 5/12/2025  7:30 AM  INR(ratio): 1.4 at 5/12/2025  7:30 AM  Age at listing (hypothetical): 65 years  Sex: Female at 5/12/2025  7:30 AM      Lab Results   Component Value Date    GLU 94 06/23/2025    BUN 14 06/23/2025    CREATININE 0.79 06/23/2025    CALCIUM 8.7 06/23/2025     06/23/2025    K 4.1 06/23/2025     06/23/2025    PROT 6.5 06/23/2025    CO2 31 (H) 06/23/2025    ANIONGAP 3 (L) 06/23/2025    WBC  2.80 (L) 06/30/2025    RBC 3.60 (L) 06/30/2025    HGB 11.6 (L) 06/30/2025    HCT 33.2 (L) 06/30/2025    MCV 92 06/30/2025    MCH 32.1 (H) 06/30/2025    MCHC 34.8 06/30/2025     Lab Results   Component Value Date    RDW 18.4 (H) 06/30/2025    PLT 34 (LL) 06/30/2025    MPV 6.7 (L) 06/30/2025    GRAN 2.1 06/30/2025    GRAN 74.7 (H) 06/30/2025    LYMPH 0.5 (L) 06/30/2025    LYMPH 16.3 (L) 06/30/2025    MONO 0.2 (L) 06/30/2025    MONO 6.5 06/30/2025    EOSINOPHIL 1.9 06/30/2025    BASOPHIL 0.6 06/30/2025    EOS 0.1 06/30/2025    BASO 0.00 06/30/2025    APTT 30.5 02/18/2021    GROUPTRH A POS 05/07/2025    GERARD POSITIVE (A) 08/21/2024    CHOL 124 06/23/2025    TRIG 75 06/23/2025    HDL 40 06/23/2025    CHOLHDL 32.3 06/23/2025    TOTALCHOLEST 3.1 06/23/2025    ALBUMIN 3.4 (L) 06/23/2025    BILIDIR 0.2 05/12/2025    AST 44 (H) 06/23/2025    ALT 23 06/23/2025    ALKPHOS 84 06/23/2025    MG 2.1 04/16/2025    INR 1.4 (H) 05/12/2025       Assessment and Plan:  Angela Avalos is a 65 y.o. female with mild decompensation of NAFLD-induced Cirrhosis. Her meld remains <15 and her fluid is stable on low doses of diuretics, although recently increased. She has varices but has not had any variceal bleeding. She has no obvious HE. She does not need a tips or a liver tx evaluation yet. However, I remain concerned about her BMI. The surgeons will be hesitant about transplanting her with a BMI close to 50. She is making strides to lose weight. This is important roberto since she now has a documented PVT and can not be anticoagulated. I am excited she has lost 9 lbs and have encouraged her to continue her weight loss efforts.   Current recommendations:   1. Cirrhosis, decompensated with edema, esophageal varices and MELD <15. Remains medically early for liver tx. Will check meld labs every 2 months and will refer for liver transplant when MELD >15. Screen for HCC every 6 months with imaging and AFP. Adriana BRASWELL (scheduled); cirrhosis likely due to  NAFLD. Recommend consider liver biopsy to r/o autoimmune hepatitis if liver enzymes increase since her GERARD is very positive. Recommend vaccination for hepatitis B.  2. Portal htn and thrombocytopenia. Small varices 9/24 (previously banded 06/23)-repeat 9/25  3. HE, none: monitor; remain off bzd  4. Edema/hx pleural effusion, stable; continue current diuretics; no need for TIPS for now;   5. Platelet threshold is 60 for use of blood thinners; with hx falls and hx varices due for repeat EGD, I continue to recommend that she remain off eliquis  6. PVT: consider ct, TP in 6 months; no AC since plts <60  7. Elevated BMI/severe obestiy- target BMI <40; continue ozempic  8. Hx falls: ammonia levels never high; not taking lactulose  9. Dental caries: would need hosp with plt transfusion since plts are <<50     Return 4 months  A total of 60 minutes was spent reviewing the patient's chart, examining the patient, reviewing labs and imaging and coordinating care with the patient's care team.

## 2025-07-07 NOTE — PATIENT INSTRUCTIONS
EGD 9/25 w Dr Salo Wright US as scheduled  Continue labs every 2 months  Continue diuretics  Monitor for high ammonia  Return 4 months

## 2025-08-14 ENCOUNTER — TELEPHONE (OUTPATIENT)
Dept: HEPATOLOGY | Facility: CLINIC | Age: 65
End: 2025-08-14
Payer: MEDICARE

## 2025-08-22 ENCOUNTER — TELEPHONE (OUTPATIENT)
Dept: HEPATOLOGY | Facility: CLINIC | Age: 65
End: 2025-08-22
Payer: MEDICARE